# Patient Record
Sex: FEMALE | Race: WHITE | NOT HISPANIC OR LATINO | Employment: UNEMPLOYED | URBAN - METROPOLITAN AREA
[De-identification: names, ages, dates, MRNs, and addresses within clinical notes are randomized per-mention and may not be internally consistent; named-entity substitution may affect disease eponyms.]

---

## 2017-01-18 ENCOUNTER — GENERIC CONVERSION - ENCOUNTER (OUTPATIENT)
Dept: OTHER | Facility: OTHER | Age: 67
End: 2017-01-18

## 2017-01-23 ENCOUNTER — GENERIC CONVERSION - ENCOUNTER (OUTPATIENT)
Dept: OTHER | Facility: OTHER | Age: 67
End: 2017-01-23

## 2017-06-14 ENCOUNTER — ALLSCRIPTS OFFICE VISIT (OUTPATIENT)
Dept: OTHER | Facility: OTHER | Age: 67
End: 2017-06-14

## 2017-06-23 ENCOUNTER — GENERIC CONVERSION - ENCOUNTER (OUTPATIENT)
Dept: OTHER | Facility: OTHER | Age: 67
End: 2017-06-23

## 2017-06-23 LAB
A/G RATIO (HISTORICAL): 1.2 (CALC) (ref 1–2.5)
ALBUMIN SERPL BCP-MCNC: 3.8 G/DL (ref 3.6–5.1)
ALP SERPL-CCNC: 79 U/L (ref 33–130)
ALT SERPL W P-5'-P-CCNC: 9 U/L (ref 6–29)
AST SERPL W P-5'-P-CCNC: 14 U/L (ref 10–35)
BASOPHILS # BLD AUTO: 0.5 %
BASOPHILS # BLD AUTO: 24 CELLS/UL (ref 0–200)
BILIRUB SERPL-MCNC: 0.5 MG/DL (ref 0.2–1.2)
BUN SERPL-MCNC: 15 MG/DL (ref 7–25)
BUN/CREA RATIO (HISTORICAL): NORMAL (CALC) (ref 6–22)
CALCIUM (ADJUSTED FOR ALBUMIN) (HISTORICAL): 9.5 MG/DL (CALC) (ref 8.6–10.2)
CALCIUM SERPL-MCNC: 9.1 MG/DL (ref 8.6–10.4)
CHLORIDE SERPL-SCNC: 108 MMOL/L (ref 98–110)
CHOLEST SERPL-MCNC: 234 MG/DL (ref 125–200)
CHOLEST/HDLC SERPL: 2.9 (CALC)
CO2 SERPL-SCNC: 23 MMOL/L (ref 20–31)
CREAT SERPL-MCNC: 0.8 MG/DL (ref 0.5–0.99)
DEPRECATED RDW RBC AUTO: 14 % (ref 11–15)
EGFR AFRICAN AMERICAN (HISTORICAL): 90 ML/MIN/1.73M2
EGFR-AMERICAN CALC (HISTORICAL): 78 ML/MIN/1.73M2
EOSINOPHIL # BLD AUTO: 149 CELLS/UL (ref 15–500)
EOSINOPHIL # BLD AUTO: 3.1 %
GAMMA GLOBULIN (HISTORICAL): 3.1 G/DL (CALC) (ref 1.9–3.7)
GLUCOSE (HISTORICAL): 85 MG/DL (ref 65–99)
HCT VFR BLD AUTO: 42.9 % (ref 35–45)
HDLC SERPL-MCNC: 81 MG/DL
HEPATITIS C ANTIBODY (HISTORICAL): NORMAL
HGB BLD-MCNC: 13.8 G/DL (ref 11.7–15.5)
LDL CHOLESTEROL (HISTORICAL): 143 MG/DL (CALC)
LYMPHOCYTES # BLD AUTO: 1368 CELLS/UL (ref 850–3900)
LYMPHOCYTES # BLD AUTO: 28.5 %
MCH RBC QN AUTO: 30 PG (ref 27–33)
MCHC RBC AUTO-ENTMCNC: 32.1 G/DL (ref 32–36)
MCV RBC AUTO: 93.6 FL (ref 80–100)
MONOCYTES # BLD AUTO: 149 CELLS/UL (ref 200–950)
MONOCYTES (HISTORICAL): 3.1 %
NEUTROPHILS # BLD AUTO: 3110 CELLS/UL (ref 1500–7800)
NEUTROPHILS # BLD AUTO: 64.8 %
NON-HDL-CHOL (CHOL-HDL) (HISTORICAL): 153 MG/DL (CALC)
PLATELET # BLD AUTO: 203 THOUSAND/UL (ref 140–400)
PMV BLD AUTO: 8.4 FL (ref 7.5–12.5)
POTASSIUM SERPL-SCNC: 4.5 MMOL/L (ref 3.5–5.3)
RBC # BLD AUTO: 4.58 MILLION/UL (ref 3.8–5.1)
SIGNAL TO CUT-OFF (HISTORICAL): 0.01
SODIUM SERPL-SCNC: 141 MMOL/L (ref 135–146)
TOTAL PROTEIN (HISTORICAL): 6.9 G/DL (ref 6.1–8.1)
TRIGL SERPL-MCNC: 49 MG/DL
TSH SERPL DL<=0.05 MIU/L-ACNC: 2.36 MIU/L (ref 0.4–4.5)
WBC # BLD AUTO: 4.8 THOUSAND/UL (ref 3.8–10.8)

## 2017-07-16 ENCOUNTER — APPOINTMENT (EMERGENCY)
Dept: RADIOLOGY | Facility: HOSPITAL | Age: 67
End: 2017-07-16
Payer: COMMERCIAL

## 2017-07-16 ENCOUNTER — HOSPITAL ENCOUNTER (EMERGENCY)
Facility: HOSPITAL | Age: 67
Discharge: HOME | End: 2017-07-16
Admitting: EMERGENCY MEDICINE
Payer: COMMERCIAL

## 2017-07-16 VITALS
SYSTOLIC BLOOD PRESSURE: 130 MMHG | TEMPERATURE: 98 F | OXYGEN SATURATION: 97 % | RESPIRATION RATE: 18 BRPM | HEART RATE: 68 BPM | WEIGHT: 195 LBS | BODY MASS INDEX: 32.49 KG/M2 | HEIGHT: 65 IN | DIASTOLIC BLOOD PRESSURE: 77 MMHG

## 2017-07-16 DIAGNOSIS — M54.9 BACK PAIN, ACUTE: ICD-10-CM

## 2017-07-16 DIAGNOSIS — S16.1XXA CERVICAL STRAIN, INITIAL ENCOUNTER: Primary | ICD-10-CM

## 2017-07-16 PROCEDURE — 99283 EMERGENCY DEPT VISIT LOW MDM: CPT

## 2017-07-16 PROCEDURE — 96372 THER/PROPH/DIAG INJ SC/IM: CPT

## 2017-07-16 PROCEDURE — 71020 HB CHEST X-RAY 2VW FRONTAL&LATL: CPT

## 2017-07-16 PROCEDURE — 72072 X-RAY EXAM THORAC SPINE 3VWS: CPT

## 2017-07-16 PROCEDURE — 72040 X-RAY EXAM NECK SPINE 2-3 VW: CPT

## 2017-07-16 RX ORDER — LANOLIN ALCOHOL/MO/W.PET/CERES
1 CREAM (GRAM) TOPICAL DAILY
COMMUNITY

## 2017-07-16 RX ORDER — POTASSIUM CHLORIDE 1.5 G/1.77G
20 POWDER, FOR SOLUTION ORAL DAILY
COMMUNITY
End: 2019-01-14 | Stop reason: ALTCHOICE

## 2017-07-16 RX ORDER — METHOCARBAMOL 500 MG/1
500 TABLET, FILM COATED ORAL ONCE
Status: COMPLETED | OUTPATIENT
Start: 2017-07-16 | End: 2017-07-16

## 2017-07-16 RX ORDER — NAPROXEN 500 MG/1
250 TABLET ORAL 2 TIMES DAILY WITH MEALS
Qty: 40 TABLET | Refills: 0 | Status: SHIPPED | OUTPATIENT
Start: 2017-07-16 | End: 2018-02-09

## 2017-07-16 RX ORDER — LEVOTHYROXINE SODIUM 0.03 MG/1
25 TABLET ORAL DAILY
COMMUNITY
End: 2018-06-21 | Stop reason: SDUPTHER

## 2017-07-16 RX ORDER — METHOCARBAMOL 750 MG/1
750 TABLET, FILM COATED ORAL 3 TIMES DAILY
Qty: 15 TABLET | Refills: 0 | Status: SHIPPED | OUTPATIENT
Start: 2017-07-16 | End: 2018-02-09

## 2017-07-16 RX ORDER — MULTIVITAMIN
2 TABLET ORAL DAILY
COMMUNITY

## 2017-07-16 RX ORDER — KETOROLAC TROMETHAMINE 30 MG/ML
15 INJECTION, SOLUTION INTRAMUSCULAR; INTRAVENOUS ONCE
Status: COMPLETED | OUTPATIENT
Start: 2017-07-16 | End: 2017-07-16

## 2017-07-16 RX ORDER — MELATONIN
5000 DAILY
COMMUNITY

## 2017-07-16 RX ORDER — AMOXICILLIN 500 MG
1 CAPSULE ORAL DAILY
COMMUNITY
End: 2018-09-25

## 2017-07-16 RX ORDER — TRAMADOL HYDROCHLORIDE 50 MG/1
50 TABLET ORAL EVERY 8 HOURS PRN
Qty: 9 TABLET | Refills: 0 | Status: SHIPPED | OUTPATIENT
Start: 2017-07-16 | End: 2018-09-25

## 2017-07-16 RX ADMIN — KETOROLAC TROMETHAMINE 15 MG: 30 INJECTION, SOLUTION INTRAMUSCULAR at 13:18

## 2017-07-16 RX ADMIN — METHOCARBAMOL 500 MG: 500 TABLET ORAL at 13:17

## 2017-07-17 ENCOUNTER — ALLSCRIPTS OFFICE VISIT (OUTPATIENT)
Dept: OTHER | Facility: OTHER | Age: 67
End: 2017-07-17

## 2017-07-17 DIAGNOSIS — S33.5XXA SPRAIN OF LIGAMENTS OF LUMBAR SPINE: ICD-10-CM

## 2017-07-17 DIAGNOSIS — S23.9XXA SPRAIN OF THORAX: ICD-10-CM

## 2017-07-24 ENCOUNTER — GENERIC CONVERSION - ENCOUNTER (OUTPATIENT)
Dept: OTHER | Facility: OTHER | Age: 67
End: 2017-07-24

## 2017-07-24 ENCOUNTER — APPOINTMENT (OUTPATIENT)
Dept: PHYSICAL THERAPY | Facility: CLINIC | Age: 67
End: 2017-07-24
Payer: COMMERCIAL

## 2017-07-24 DIAGNOSIS — S23.9XXA SPRAIN OF THORAX: ICD-10-CM

## 2017-07-24 DIAGNOSIS — S33.5XXA SPRAIN OF LIGAMENTS OF LUMBAR SPINE: ICD-10-CM

## 2017-07-24 PROCEDURE — 97162 PT EVAL MOD COMPLEX 30 MIN: CPT

## 2017-07-27 ENCOUNTER — GENERIC CONVERSION - ENCOUNTER (OUTPATIENT)
Dept: OTHER | Facility: OTHER | Age: 67
End: 2017-07-27

## 2017-07-31 ENCOUNTER — APPOINTMENT (OUTPATIENT)
Dept: PHYSICAL THERAPY | Facility: CLINIC | Age: 67
End: 2017-07-31
Payer: COMMERCIAL

## 2017-07-31 PROCEDURE — 97110 THERAPEUTIC EXERCISES: CPT

## 2017-08-02 ENCOUNTER — ALLSCRIPTS OFFICE VISIT (OUTPATIENT)
Dept: OTHER | Facility: OTHER | Age: 67
End: 2017-08-02

## 2017-08-02 ENCOUNTER — APPOINTMENT (OUTPATIENT)
Dept: PHYSICAL THERAPY | Facility: CLINIC | Age: 67
End: 2017-08-02
Payer: COMMERCIAL

## 2017-08-04 ENCOUNTER — APPOINTMENT (OUTPATIENT)
Dept: PHYSICAL THERAPY | Facility: CLINIC | Age: 67
End: 2017-08-04
Payer: COMMERCIAL

## 2017-08-04 PROCEDURE — 97110 THERAPEUTIC EXERCISES: CPT

## 2017-08-07 ENCOUNTER — APPOINTMENT (OUTPATIENT)
Dept: PHYSICAL THERAPY | Facility: CLINIC | Age: 67
End: 2017-08-07
Payer: COMMERCIAL

## 2017-08-07 PROCEDURE — 97110 THERAPEUTIC EXERCISES: CPT

## 2017-08-09 ENCOUNTER — ALLSCRIPTS OFFICE VISIT (OUTPATIENT)
Dept: OTHER | Facility: OTHER | Age: 67
End: 2017-08-09

## 2017-08-10 ENCOUNTER — APPOINTMENT (OUTPATIENT)
Dept: PHYSICAL THERAPY | Facility: CLINIC | Age: 67
End: 2017-08-10
Payer: COMMERCIAL

## 2017-08-10 PROCEDURE — 97140 MANUAL THERAPY 1/> REGIONS: CPT

## 2017-08-10 PROCEDURE — 97110 THERAPEUTIC EXERCISES: CPT

## 2017-08-11 ENCOUNTER — GENERIC CONVERSION - ENCOUNTER (OUTPATIENT)
Dept: OTHER | Facility: OTHER | Age: 67
End: 2017-08-11

## 2017-08-14 ENCOUNTER — APPOINTMENT (OUTPATIENT)
Dept: PHYSICAL THERAPY | Facility: CLINIC | Age: 67
End: 2017-08-14
Payer: COMMERCIAL

## 2017-08-14 PROCEDURE — 97110 THERAPEUTIC EXERCISES: CPT

## 2017-08-14 PROCEDURE — 97140 MANUAL THERAPY 1/> REGIONS: CPT

## 2017-08-15 ENCOUNTER — ALLSCRIPTS OFFICE VISIT (OUTPATIENT)
Dept: OTHER | Facility: OTHER | Age: 67
End: 2017-08-15

## 2017-08-17 ENCOUNTER — APPOINTMENT (OUTPATIENT)
Dept: PHYSICAL THERAPY | Facility: CLINIC | Age: 67
End: 2017-08-17
Payer: COMMERCIAL

## 2017-08-17 PROCEDURE — 97110 THERAPEUTIC EXERCISES: CPT

## 2017-08-17 PROCEDURE — 97140 MANUAL THERAPY 1/> REGIONS: CPT

## 2017-08-21 ENCOUNTER — APPOINTMENT (OUTPATIENT)
Dept: PHYSICAL THERAPY | Facility: CLINIC | Age: 67
End: 2017-08-21
Payer: COMMERCIAL

## 2017-08-21 PROCEDURE — 97010 HOT OR COLD PACKS THERAPY: CPT

## 2017-08-21 PROCEDURE — 97140 MANUAL THERAPY 1/> REGIONS: CPT

## 2017-08-21 PROCEDURE — 97110 THERAPEUTIC EXERCISES: CPT

## 2017-08-21 PROCEDURE — 97035 APP MDLTY 1+ULTRASOUND EA 15: CPT

## 2017-08-24 ENCOUNTER — APPOINTMENT (OUTPATIENT)
Dept: PHYSICAL THERAPY | Facility: CLINIC | Age: 67
End: 2017-08-24
Payer: COMMERCIAL

## 2017-08-24 PROCEDURE — 97110 THERAPEUTIC EXERCISES: CPT

## 2017-08-24 PROCEDURE — 97010 HOT OR COLD PACKS THERAPY: CPT

## 2017-08-24 PROCEDURE — 97140 MANUAL THERAPY 1/> REGIONS: CPT

## 2017-08-25 ENCOUNTER — GENERIC CONVERSION - ENCOUNTER (OUTPATIENT)
Dept: OTHER | Facility: OTHER | Age: 67
End: 2017-08-25

## 2017-08-26 ENCOUNTER — GENERIC CONVERSION - ENCOUNTER (OUTPATIENT)
Dept: OTHER | Facility: OTHER | Age: 67
End: 2017-08-26

## 2017-08-27 ENCOUNTER — GENERIC CONVERSION - ENCOUNTER (OUTPATIENT)
Dept: OTHER | Facility: OTHER | Age: 67
End: 2017-08-27

## 2017-08-28 ENCOUNTER — GENERIC CONVERSION - ENCOUNTER (OUTPATIENT)
Dept: OTHER | Facility: OTHER | Age: 67
End: 2017-08-28

## 2017-08-28 ENCOUNTER — APPOINTMENT (OUTPATIENT)
Dept: PHYSICAL THERAPY | Facility: CLINIC | Age: 67
End: 2017-08-28
Payer: COMMERCIAL

## 2017-08-31 ENCOUNTER — APPOINTMENT (OUTPATIENT)
Dept: PHYSICAL THERAPY | Facility: CLINIC | Age: 67
End: 2017-08-31
Payer: COMMERCIAL

## 2017-08-31 PROCEDURE — 97110 THERAPEUTIC EXERCISES: CPT

## 2017-09-05 ENCOUNTER — ALLSCRIPTS OFFICE VISIT (OUTPATIENT)
Dept: OTHER | Facility: OTHER | Age: 67
End: 2017-09-05

## 2017-09-05 DIAGNOSIS — M54.50 LOW BACK PAIN: ICD-10-CM

## 2017-09-05 DIAGNOSIS — M79.2 NEURALGIA AND NEURITIS: ICD-10-CM

## 2017-09-06 ENCOUNTER — APPOINTMENT (OUTPATIENT)
Dept: PHYSICAL THERAPY | Facility: CLINIC | Age: 67
End: 2017-09-06
Payer: COMMERCIAL

## 2017-09-06 PROCEDURE — 97110 THERAPEUTIC EXERCISES: CPT

## 2017-09-08 ENCOUNTER — APPOINTMENT (OUTPATIENT)
Dept: PHYSICAL THERAPY | Facility: CLINIC | Age: 67
End: 2017-09-08
Payer: COMMERCIAL

## 2017-09-08 PROCEDURE — 97110 THERAPEUTIC EXERCISES: CPT

## 2017-09-11 ENCOUNTER — APPOINTMENT (OUTPATIENT)
Dept: PHYSICAL THERAPY | Facility: CLINIC | Age: 67
End: 2017-09-11
Payer: COMMERCIAL

## 2017-09-11 PROCEDURE — 97110 THERAPEUTIC EXERCISES: CPT

## 2017-09-14 ENCOUNTER — APPOINTMENT (OUTPATIENT)
Dept: PHYSICAL THERAPY | Facility: CLINIC | Age: 67
End: 2017-09-14
Payer: COMMERCIAL

## 2017-09-14 PROCEDURE — 97110 THERAPEUTIC EXERCISES: CPT

## 2017-09-14 PROCEDURE — G8992 OTHER PT/OT  D/C STATUS: HCPCS | Performed by: PHYSICAL THERAPIST

## 2017-09-14 PROCEDURE — G8991 OTHER PT/OT GOAL STATUS: HCPCS | Performed by: PHYSICAL THERAPIST

## 2017-09-18 ENCOUNTER — HOSPITAL ENCOUNTER (OUTPATIENT)
Dept: MRI IMAGING | Facility: HOSPITAL | Age: 67
Discharge: HOME | End: 2017-09-18
Attending: FAMILY MEDICINE
Payer: COMMERCIAL

## 2017-09-18 ENCOUNTER — GENERIC CONVERSION - ENCOUNTER (OUTPATIENT)
Dept: OTHER | Facility: OTHER | Age: 67
End: 2017-09-18

## 2017-09-18 DIAGNOSIS — M54.50 LOW BACK PAIN: ICD-10-CM

## 2017-09-18 DIAGNOSIS — M79.2 NEURALGIA AND NEURITIS: ICD-10-CM

## 2017-09-18 PROCEDURE — 72148 MRI LUMBAR SPINE W/O DYE: CPT

## 2017-09-22 ENCOUNTER — GENERIC CONVERSION - ENCOUNTER (OUTPATIENT)
Dept: OTHER | Facility: OTHER | Age: 67
End: 2017-09-22

## 2017-09-25 ENCOUNTER — GENERIC CONVERSION - ENCOUNTER (OUTPATIENT)
Dept: OTHER | Facility: OTHER | Age: 67
End: 2017-09-25

## 2017-10-02 ENCOUNTER — GENERIC CONVERSION - ENCOUNTER (OUTPATIENT)
Dept: OTHER | Facility: OTHER | Age: 67
End: 2017-10-02

## 2017-11-17 ENCOUNTER — ALLSCRIPTS OFFICE VISIT (OUTPATIENT)
Dept: OTHER | Facility: OTHER | Age: 67
End: 2017-11-17

## 2017-11-20 NOTE — PROGRESS NOTES
Assessment  1  Chronic right-sided thoracic back pain (724 1,338 29) (M54 6,G89 29)   2  Lower back pain (724 2) (M54 5)   3  Pain syndrome, chronic (338 4) (G89 4)   4  Neuropathic pain (729 2) (M79 2)    Plan   Neuropathic pain, Thoracic sprain    · Gabapentin 300 MG Oral Capsule; Take 1 po hs   Rx By: Author Mujica; Dispense: 30 Days ; #:30 Capsule; Refill: 2;Neuropathic pain, Thoracic sprain; SALVADOR = N; Verified Transmission to [facility]/PHARMACY# 7026; Last Updated By: System, [employer]; 11/17/2017 9:02:49 AM  Follow-up visit in 3 months Evaluation and Treatment  Follow-up  Status: Hold For - Scheduling  Requested for: 30QXH1893 Ordered; For: Pain syndrome, chronic;  Ordered By: Author Mujica  Performed:   Due: 69AUG5242   Discussion/Summary    While the patient was in the office today, I did have a thorough conversation with the patient regarding her medication regimen and treatment plan  I explained to her that recently there has been more reporting of abuse of different medications and gabapentin is 1 of them  However, I explained to her that the key is that the medication is being abused, meaning that it is not being taken by people who it is prescribed to and/or not taken as prescribed and in significantly larger amounts insure periods of time to create a euphoric feeling  I thoroughly reviewed with the patient that she is on a very low dose of the gabapentin and that gabapentin is truly a very safe medication as long as it is taken as prescribed like any other medication  I explained to her that it is not a narcotic and I explained how it works and why it works and why it makes sense for her kind of pain to be managed on it for now  The patient seemed significantly relieved after our conversation  at this point after a thorough conversation with the patient we decided to decrease the gabapentin down to 300 mg a day for the next 3 months and see how she does   I did explain to the patient that no matter what medication we manage patient's on, our goal is always to have patients on the least amount of medicines with the least amount of side effects  At her next office visit we would then re-evaluate her need for the Gabapentin and even consider further titrating her down to 200 mg a day or less  The patient was agreeable and verbalized an understanding  patient is to schedule a follow-up office visit in 3 months and at that point time we will regroup with regards to their medication regimen and treatment plan  The patient was agreeable and verbalized an understanding  The patient has the current Goals: To continue with at least a current level of pain relief and to try to slowly and steadily titrate down on the gabapentin as far as possible over time  The patent has the current Barriers: Chronic painSyndrome and post herpetic neuralgia  Patient is able to Self-Care  The treatment plan was reviewed with the patient/guardian  The patient/guardian understands and agrees with the treatment plan   The patient was counseled regarding instructions for management,-- risk factor reductions,-- prognosis,-- patient and family education,-- risks and benefits of treatment options-- and-- importance of compliance with treatment  total time of encounter was 25 minutes  Chief Complaint    1  Pain  Chronic right sided thoracic and low back pain, significantly approved/stable  History of Present Illness  The patient presents today for a follow-up office visit  She is currently being treated for her chronic right-sided thoracic and low back pain, most likely related to her post herpetic neuralgia and her lumbar facet arthrosis, which the patient reports has significantly improved to the point of almost complete resolution of her pain with the changes made to her medication regimen   The patient reports that she has almost 100% relief of her pain symptoms, without any side effects from the gabapentin, however, she reports that she was reading a news article that stated the gabapentin is going to be the next heroin and she is afraid to stay on this medication, however, she is also afraid to not take the medication because she is having such significant pain relief  The patient presents today to discuss her medication regimen and treatment plan  Annamaria Zepeda presents with complaints of occasional episodes of mild right upper back and right lower back pain, radiating to the upper back and lower back  On a scale of 1 to 10, the patient rates the pain as 1  Symptoms are improving  Review of Systems   Constitutional: no fever,-- no recent weight gain-- and-- no recent weight loss  Eyes: no double vision-- and-- no blurry vision  Cardiovascular: no chest pain,-- no palpitations-- and-- no lower extremity edema  Respiratory: no complaints of shortness of breath-- and-- no wheezing  Musculoskeletal: no difficulty walking,-- no muscle weakness,-- no joint stiffness,-- no joint swelling,-- no limb swelling,-- no pain in extremity-- and-- no decreased range of motion  Neurological: no dizziness,-- no difficulty swallowing,-- no memory loss,-- no loss of consciousness-- and-- no seizures  Gastrointestinal: no nausea,-- no vomiting,-- no constipation-- and-- no diarrhea  Genitourinary: no difficulty initiating urine stream,-- no genital pain-- and-- no frequent urination  Integumentary: no complaints of skin rash  Psychiatric: no depression  Endocrine: no excessive thirst,-- no adrenal disease,-- no hypothyroidism-- and-- no hyperthyroidism  Hematologic/Lymphatic: no tendency for easy bruising-- and-- no tendency for easy bleeding  Active Problems  1  Anxiety (300 00) (F41 9)   2  Atrial fibrillation (427 31) (I48 91)   3  BMI 34 0-34 9,adult (V85 34) (Z68 34)   4  Bruit of left carotid artery (785 9) (R09 89)   5  Chronic right-sided thoracic back pain (724 1,338 29) (M54 6,G89 29)   6   Esophageal reflux (974 81) (K21 9)   7  Hiatal hernia (553 3) (K44 9)   8  Hyperlipidemia (272 4) (E78 5)   9  Hypothyroidism (244 9) (E03 9)   10  Lower back pain (724 2) (M54 5)   11  Lumbar sprain (847 2) (S33 5XXA)   12  Neuropathic pain (729 2) (M79 2)   13  Osteoporosis (733 00) (M81 0)   14  Pain of right scapula (733 90) (M89 8X1)   15  Pain syndrome, chronic (338 4) (G89 4)   16  Phlebitis or thrombophlebitis of superficial vessel of lower extremity (451 0) (I80 00)   17  Post herpetic neuralgia (053 19) (B02 29)   18  S/P RF ablation operation for arrhythmia (V45 89) (Z98 890,Z86 79)   19  Screening for genitourinary condition (V81 6) (Z13 89)   20  Screening for neurological condition (V80 09) (Z13 89)   21  Shoulder pain (719 41) (M25 519)   22  Thoracic sprain (847 1) (S23 9XXA)   23  Varicose Veins Of Lower Extremities (454 9)   24  Vitamin D deficiency (268 9) (E55 9)    Past Medical History    1  History of Encounter for screening mammogram for malignant neoplasm of breast (V76 12) (Z12 31)   2  History of Fibrocystic breast disease, unspecified laterality (610 1) (N60 19)   3  History of Ganglion Of The Right Wrist (727 43)   4  History of herpes zoster (V12 09) (Z86 19)   5  History of postmenopausal hormone replacement therapy (V87 49) (Z92 29)   6  History of Lyme disease (088 81) (A69 20)   7  History of Screening for osteoporosis (V82 81) (Z13 820)    The active problems and past medical history were reviewed and updated today  Surgical History  1  History of Biopsy Breast Open   2  History of Colonoscopy (Fiberoptic) Screening   3  History of Heart Surgery    The surgical history was reviewed and updated today  Family History  Mother    1  Family history of Diabetes Mellitus (V18 0)  Sister    2  Family history of Cerebral Artery Aneurysm  Maternal Grandmother    3  Family history of diabetes mellitus (V18 0) (Z83 3)  Family History    4  Family history of cardiac disorder (V17 49) (Z82 49)   5   Family history of Heart Disease (V17 49)    The family history was reviewed and updated today  Social History     · Denied: History of Drug use   ·    · Never a smoker   · Never Drank Alcohol  The social history was reviewed and updated today  The social history was reviewed and is unchanged  Current Meds   1  Maya-C 500 MG TABS; TAKE 1 TABLET DAILY; Therapy: (Recorded:04Apr2016) to Recorded   2  Flecainide Acetate 50 MG Oral Tablet; TAKE 1 TABLET TWICE DAILY Recorded   3  Gabapentin 400 MG Oral Capsule; TAKE 1 CAPSULE AT BEDTIME; Therapy: 93GCO7485 to (Mae Garcia)  Requested for: 42RSA3717; Last Rx:03Uqi8128 Ordered   4  Glucosamine Chondroitin TABS; Therapy: (Recorded:90Mqr7394) to Recorded   5  Klor-Con M20 20 MEQ Oral Tablet Extended Release; TAKE 1 TABLET DAILY; Therapy: (Recorded:04Apr2016) to Recorded   6  KP Fish Oil 1200 MG Oral Capsule; take 1 capsule daily; Therapy: (Recorded:65Koz3191) to Recorded   7  Levothyroxine Sodium 25 MCG Oral Tablet; take 1 tablet by mouth every day; Therapy: 64DLD7264 to ()  Requested for: 88ZHS1183; Last Rx:01Oct2017 Ordered   8  Magnesium CAPS; Therapy: (Recorded:16Tyw9153) to Recorded   9  Metoprolol Succinate ER 25 MG Oral Tablet Extended Release 24 Hour; TAKE 1 TABLET DAILY; Therapy: (Recorded:62Qbz3044) to Recorded   10  Multivitamins Oral Capsule; Therapy: 66YHK7363 to Recorded   11  Progesterone CREA; USE EXTERNALLY AS DIRECTED; Therapy: (Recorded:04Apr2016) to Recorded   12  Red Yeast Rice 600 MG Oral Capsule; Therapy: 17XSC5392 to Recorded   13  Vitamin D3 5000 UNIT Oral Capsule; 1 tablet daily; Therapy: (Recorded:04Apr2016) to Recorded   14  Xarelto 20 MG Oral Tablet; Take one tablet daily at night; Therapy: (Recorded:39Gsj8719) to Recorded    The medication list was reviewed and updated today  Allergies  1   No Known Drug Allergies    Vitals  Vital Signs    Recorded: 51EXD7771 08:47AM   Temperature 98 F   Heart Rate 56 Systolic 948   Diastolic 74   Height 5 ft 4 in   Weight 206 lb    BMI Calculated 35 36   BSA Calculated 1 98   Pain Scale 1       Physical Exam   Constitutional  General appearance: Well developed, well nourished, alert, in no distress, non-toxic and no overt pain behavior  Eyes  Sclera: anicteric  HEENT  Hearing grossly intact  Pulmonary  Respiratory effort: Even and unlabored  Cardiovascular  Examination of extremities: No edema or pitting edema present  Abdomen  Abdomen: Soft, non-tender, non-distended  Skin  Skin and subcutaneous tissue: Normal without rashes or lesions, well hydrated  Psychiatric  Mood and affect: Mood and affect appropriate  Neurologic  Cranial nerves: Cranial nerves II-XII grossly intact     the muscle tone was normal  Musculoskeletal Tandem Gait: Intact      Future Appointments    Date/Time Provider Specialty Site   02/09/2018 10:15 AM [facility] [place] [address]       Signatures   Electronically signed by : Kala Villatoro ; Nov 19 2017  7:24AM EST                       (Author)    Electronically signed by : Zari Bernal DO; Nov 19 2017  4:29PM EST

## 2018-01-12 VITALS
WEIGHT: 200.25 LBS | OXYGEN SATURATION: 98 % | SYSTOLIC BLOOD PRESSURE: 120 MMHG | TEMPERATURE: 99.2 F | DIASTOLIC BLOOD PRESSURE: 80 MMHG | HEART RATE: 120 BPM | HEIGHT: 64 IN | BODY MASS INDEX: 34.19 KG/M2

## 2018-01-12 VITALS
WEIGHT: 206 LBS | TEMPERATURE: 98 F | SYSTOLIC BLOOD PRESSURE: 116 MMHG | HEART RATE: 56 BPM | BODY MASS INDEX: 35.17 KG/M2 | DIASTOLIC BLOOD PRESSURE: 74 MMHG | HEIGHT: 64 IN

## 2018-01-12 VITALS
OXYGEN SATURATION: 97 % | SYSTOLIC BLOOD PRESSURE: 115 MMHG | HEIGHT: 64 IN | BODY MASS INDEX: 34.4 KG/M2 | WEIGHT: 201.5 LBS | HEART RATE: 75 BPM | TEMPERATURE: 98.4 F | DIASTOLIC BLOOD PRESSURE: 70 MMHG

## 2018-01-12 VITALS
HEIGHT: 64 IN | OXYGEN SATURATION: 98 % | WEIGHT: 199 LBS | TEMPERATURE: 97.2 F | SYSTOLIC BLOOD PRESSURE: 112 MMHG | HEART RATE: 67 BPM | BODY MASS INDEX: 33.97 KG/M2 | DIASTOLIC BLOOD PRESSURE: 76 MMHG

## 2018-01-12 NOTE — RESULT NOTES
Discussion/Summary   labs are stable but chol is too hih  see me to disus meds for this  Verified Results  (Q) CBC (INCLUDES DIFF/PLT) (REFL) 26VJA8585 12:00AM [facility]     Test Name Result Flag Reference   WHITE BLOOD CELL COUNT 4 8 Thousand/uL  3 8-10 8   RED BLOOD CELL COUNT 4 58 Million/uL  3 80-5 10   HEMOGLOBIN 13 8 g/dL  11 7-15 5   HEMATOCRIT 42 9 %  35 0-45 0   MCV 93 6 fL  80 0-100 0   MCH 30 0 pg  27 0-33 0   MCHC 32 1 g/dL  32 0-36 0   RDW 14 0 %  11 0-15 0   PLATELET COUNT 060 Thousand/uL  140-400   MPV 8 4 fL  7 5-12 5   ABSOLUTE NEUTROPHILS 3110 cells/uL  9341-5285   ABSOLUTE LYMPHOCYTES 1368 cells/uL  850-3900   ABSOLUTE MONOCYTES 149 cells/uL L 200-950   ABSOLUTE EOSINOPHILS 149 cells/uL     ABSOLUTE BASOPHILS 24 cells/uL  0-200   NEUTROPHILS 64 8 %     LYMPHOCYTES 28 5 %     MONOCYTES 3 1 %     EOSINOPHILS 3 1 %     BASOPHILS 0 5 %       (Q) COMPREHENSIVE METABOLIC PNL W/ADJUSTED CALCIUM 17NKS2194 12:00AM [facility]     Test Name Result Flag Reference   GLUCOSE 85 mg/dL  65-99   Fasting reference interval   UREA NITROGEN (BUN) 15 mg/dL  7-25   CREATININE 0 80 mg/dL  0 50-0 99   For patients >52years of age, the reference limit  for Creatinine is approximately 13% higher for people  identified as -American  eGFR NON-AFR   AMERICAN 78 mL/min/1 73m2  > OR = 60   eGFR AFRICAN AMERICAN 90 mL/min/1 73m2  > OR = 60   BUN/CREATININE RATIO   1-36   NOT APPLICABLE (calc)   SODIUM 141 mmol/L  135-146   POTASSIUM 4 5 mmol/L  3 5-5 3   CHLORIDE 108 mmol/L     CARBON DIOXIDE 23 mmol/L  20-31   CALCIUM 9 1 mg/dL  8 6-10 4   CALCIUM (ADJUSTED FOR$ALBUMIN) 9 5 mg/dL (calc)  8 6-10 2   PROTEIN, TOTAL 6 9 g/dL  6 1-8 1   ALBUMIN 3 8 g/dL  3 6-5 1   GLOBULIN 3 1 g/dL (calc)  1 9-3 7   ALBUMIN/GLOBULIN RATIO 1 2 (calc)  1 0-2 5   BILIRUBIN, TOTAL 0 5 mg/dL  0 2-1 2   ALKALINE PHOSPHATASE 79 U/L     AST 14 U/L  10-35   ALT 9 U/L  6-29     (Q) HEPATITIS C ANTIBODY 96VMQ4567 12: Alan Granado     Test Name Result Flag Reference   HEPATITIS C ANTIBODY NON-REACTIVE  NON-REACTIVE   SIGNAL TO CUT-OFF 0 01  <1 00     (Q) LIPID PANEL WITH NON-HDL CHOLESTEROL 16SZO5032 12:00AM Susy Lira     Test Name Result Flag Reference   CHOLESTEROL, TOTAL 234 mg/dL H 125-200   HDL CHOLESTEROL 81 mg/dL  > OR = 46   TRIGLICERIDES 49 mg/dL  <817   LDL-CHOLESTEROL 143 mg/dL (calc) H <130   Desirable range <100 mg/dL for patients with CHD or  diabetes and <70 mg/dL for diabetic patients with  known heart disease  CHOL/HDLC RATIO 2 9 (calc)  < OR = 5 0   NON HDL CHOLESTEROL 153 mg/dL (calc)     Target for non-HDL cholesterol is 30 mg/dL higher than   LDL cholesterol target       (Q) TSH, 3RD GENERATION 56UIG7304 12:00AM Susy Lira     Test Name Result Flag Reference   TSH 2 36 mIU/L  0 40-4 50

## 2018-01-12 NOTE — MISCELLANEOUS
Message   Date: 25 Aug 2017 4:01 PM EST, Recorded By: Joie Nevarez For: Hardeep Isaac: Dr Nina Hill, Provider   Shannon Pradhan from Dr Nina Hill requested records  Faxed consultation note & x-ray to [phone]        Active Problems    1  Anxiety (300 00) (F41 9)   2  Atrial fibrillation (427 31) (I48 91)   3  BMI 34 0-34 9,adult (V85 34) (Z68 34)   4  Bruit of left carotid artery (785 9) (R09 89)   5  Esophageal reflux (530 81) (K21 9)   6  Hiatal hernia (553 3) (K44 9)   7  Hyperlipidemia (272 4) (E78 5)   8  Hypothyroidism (244 9) (E03 9)   9  Lower back pain (724 2) (M54 5)   10  Lumbar sprain (847 2) (S33 5XXA)   11  Neuropathic pain (729 2) (M79 2)   12  Osteoporosis (733 00) (M81 0)   13  Phlebitis or thrombophlebitis of superficial vessel of lower extremity (451 0) (I80 00)   14  S/P RF ablation operation for arrhythmia (V45 89) (Z98 890,Z86 79)   15  Shoulder pain (719 41) (M25 519)   16  Thoracic sprain (847 1) (S23 9XXA)   17  Varicose Veins Of Lower Extremities (454 9)   18  Vitamin D deficiency (268 9) (E55 9)    Current Meds   1  Calcium TABS; Therapy: (Recorded:96Mpo0470) to Recorded   2  Maya-C 500 MG TABS; TAKE 1 TABLET DAILY; Therapy: (Recorded:04Apr2016) to Recorded   3  Fish Oil CAPS; Therapy: (Dylan Lev) to Recorded   4  Gabapentin 100 MG Oral Capsule; Take one tablet at bedtime x4 days, then take 2   tablets at bedtime x4 days, then 3 tablets at bedtime; Therapy: 42BWH3706 to (Evaluate:63Byi0393)  Requested for: 62Epf3594 Recorded   5  Glucosamine Chondroitin TABS; Therapy: (Recorded:84Xcb8190) to Recorded   6  Klor-Con M20 20 MEQ Oral Tablet Extended Release; TAKE 1 TABLET DAILY; Therapy: (Recorded:04Apr2016) to Recorded   7  Levothyroxine Sodium 25 MCG Oral Tablet; take 1 tablet by mouth every day; Therapy: 55JCQ8303 to (Evaluate:12Gwy9951)  Requested for: 02Sep2016; Last   Rx:02Sep2016 Ordered   8  Magnesium CAPS;    Therapy: (Recorded:47Xrf9266) to Recorded   9  Multivitamins Oral Capsule; Therapy: 10QSN4536 to Recorded   10  Omega-3 Fish Oil 1000 MG Oral Capsule; TAKE 1 CAPSULE DAILY; Therapy: 00CSX4023 to Recorded   11  Progesterone CREA; USE EXTERNALLY AS DIRECTED; Therapy: (Recorded:33Muk9046) to Recorded   12  Red Yeast Rice 600 MG Oral Capsule; Therapy: 80PHJ5396 to Recorded   13  Vitamin D3 1000 UNIT Oral Capsule; TAKE AS DIRECTED; Therapy: 72KNW7712 to Recorded   14  Vitamin D3 5000 UNIT Oral Capsule; 1 tablet daily; Therapy: (Recorded:04Apr2016) to Recorded    Allergies    1  No Known Drug Allergies    Signatures   Electronically signed by :  Mariella Guidry, ; Aug 25 2017  4:04PM EST                       (Author)

## 2018-01-13 VITALS
HEART RATE: 88 BPM | DIASTOLIC BLOOD PRESSURE: 64 MMHG | HEIGHT: 64 IN | SYSTOLIC BLOOD PRESSURE: 118 MMHG | BODY MASS INDEX: 33.97 KG/M2 | TEMPERATURE: 98.2 F | WEIGHT: 199 LBS

## 2018-01-13 VITALS
HEART RATE: 118 BPM | SYSTOLIC BLOOD PRESSURE: 104 MMHG | HEIGHT: 64 IN | TEMPERATURE: 98.2 F | WEIGHT: 199 LBS | DIASTOLIC BLOOD PRESSURE: 72 MMHG | BODY MASS INDEX: 33.97 KG/M2 | OXYGEN SATURATION: 97 %

## 2018-01-13 NOTE — RESULT NOTES
Message   i     Verified Results  (1) LIPID PANEL, FASTING 05Apr2016 07:00AM Marcelino Mendez     Test Name Result Flag Reference   CHOLESTEROL, TOTAL 244 mg/dL H 125-200   HDL CHOLESTEROL 68 mg/dL  > OR = 46   TRIGLICERIDES 81 mg/dL  <118   LDL-CHOLESTEROL 160 mg/dL (calc) H <130   Desirable range <100 mg/dL for patients with CHD or  diabetes and <70 mg/dL for diabetic patients with  known heart disease  CHOL/HDLC RATIO 3 6 (calc)  < OR = 5 0   NON HDL CHOLESTEROL 176 mg/dL (calc) H    Target for non-HDL cholesterol is 30 mg/dL higher than   LDL cholesterol target  See Below     We received your handwritten test order and  performed the AMA defined lipid panel  If  this is not what you intended to order, please  contact your local   immediately so that we may adjust our billing  appropriately  You may also inquire about  alternative or additional testing  NO COLLECTION DATE RECEIVED  WE HAVE USED  THE DATE THE SPECIMEN WAS RECEIVED BY THIS  LABORATORY AS THE COLLECTION DATE  IF THIS  IS INCORRECT, PLEASE CONTACT CLIENT SERVICES  PHONE NUMBER: [phone]       Discussion/Summary   chol still too high    we will talk about meds at next visit

## 2018-01-13 NOTE — MISCELLANEOUS
Assessment   1  Atrial fibrillation (427 31) (I48 91)  2  Phlebitis or thrombophlebitis of superficial vessel of lower extremity (451 0) (I80 00)  3  Neuropathic pain (729 2) (M79 2)  4  Lower back pain (724 2) (M54 5)    Plan  Atrial fibrillation    · Routine Venipuncture - POC; Status:Complete;   Done: [id number]  Perform:[facility]; TVB:[id number]; Last Updated By:Fahad Morales; 9/5/2017 9:50:25   AM;Ordered; For:Atrial fibrillation; Ordered By:Paty Baird;  Lower back pain, Neuropathic pain    · * MRI LUMBAR SPINE WO CONTRAST; Status:Need Information - Financial  Authorization; Requested OYR:[id number];   Perform:[facility]; YAL:[id number]; Ordered; For:Lower back pain,   Neuropathic pain; Ordered By:Sharan Kraft;      1  Afib - the patient's heart rate and rhythm are regular today  She will continue her 3 medications that were restarted  They have been stopped after she had been out of A  fib for a year initially  She has follow-up with her cardiologist in October  She has a prescription from the cardiologist to have a flecainide level drawn today which will be done here in the office  2  Phlebitis-stable, ultrasounds in the hospital were negative for DVT  3  Neuropathic lower back pain-the patient has now done physical therapy, seen pain management, been on new medications and still has basically no relief of her symptoms  I am going to order an MRI  For now I advised that she continue the gabapentin that she feels it is giving her some relief  At this point she is not going to reschedule for any kind of injection given the A  fib and restarting blood thinners  She will wait to follow-up with her cardiologist to decide on this  Discussion/Summary  Medication SE Review and Pt Understands Tx: Possible side effects of new medications were reviewed with the patient/guardian today  The treatment plan was reviewed with the patient/guardian   The patient/guardian understands and agrees with the treatment plan      Chief Complaint  Chief Complaint Free Text Note Form: PT HERE FOR A FOLLOW UP TO [facility] [address] FOR A-FIB  PT WAS ADMITTED TO [facility] 8/2517 AND DISCHARGED ON 8/28/17  PT WAS AT THE SPINE DOCTOR AND HE WAS CONCERNED ABOUT HER HEART RATE AND SENT HER TO THE ER  PT HAS TO HAVE A FLECAINIDE LEVEL DONE TODAY FOR THE CARDIOLOGIST  History of Present Illness  TCM Communication [facility]: The patient is being contacted for follow-up after hospitalization  She was hospitalized at [facility]  The date of admission: 08/25/2017, date of discharge: 08/28/2017  Diagnosis: A-Fib  She was discharged to home  The patient is currently asymptomatic     Communication performed and completed by Rosibel Ortega   HPI: pt is here to f/u after a hospitalization at [facility]  she went to see a doctor who was supposed to do an injection   when she got there he evaluated her and he told her he couldn't do it - her HR was very high  he sent her directly to the ER after talking with her cardiologist  when she got the the ER she was HR was in the mid 100s  they could not get it down in the ER so she was admitted  admitted 8/21 discharge 8/25  they got her rate controlled with meds  they were going to do a cardioversion but they cancelled it Monday am because she was finally out of afib    she has had afib in 2015  had an ablation at that time  at that time she could feel it going back and forth  had episodes of passing out  this time she did not feel it at all  she had been feeling "crappy" for months - dragging, tired, just felt horrible  now she feels much better, actually feels great  no cp/sob now  no f/c  she's now back on xarelto, metoprolol and flecainide    they did u/s of both legs to r/o clots - they were negative  she has a h/o phlebitis    she has had back pain since May  no known trauma - she had been weeding a lot but does not remember a specific injury  she has been going to PT 2x/week for 6 weeks  she saw Dr Mila Yancey one time on 8/9/17  he sent her to Dr Nicolette Beth for a trigger point injection but never got it done as this was the day she was sent to the hospital  no radiation into the legs   always the same spot, over the upper lumbar spine and lower thoracic spine as well as just to the right of this area   she feels like she is "rolling on a ball" if she puts pressure in that spot - she was doing exercises at PT and told the therapist this   Dr Rudolph did start her on gabapentin which she feels is helping a little bit       Review of Systems    Preventive Quality 65 Older: Preventive Quality 65 and Older:1  The patient is currently asymptomatic1  Symptoms Include: 1    The patient currently has no urinary incontinence symptoms1         1 Amended By: Bashir Serrano; Sep 05 2017 10:36 AM EST    Active Problems   1  Anxiety (300 00) (F41 9)  2  Atrial fibrillation (427 31) (I48 91)  3  BMI 34 0-34 9,adult (V85 34) (Z68 34)  4  Bruit of left carotid artery (785 9) (R09 89)  5  Esophageal reflux (530 81) (K21 9)  6  Hiatal hernia (553 3) (K44 9)  7  Hyperlipidemia (272 4) (E78 5)  8  Hypothyroidism (244 9) (E03 9)  9  Lower back pain (724 2) (M54 5)  10  Lumbar sprain (847 2) (S33 5XXA)  11  Neuropathic pain (729 2) (M79 2)  12  Osteoporosis (733 00) (M81 0)  13  Phlebitis or thrombophlebitis of superficial vessel of lower extremity (451 0) (I80 00)  14  S/P RF ablation operation for arrhythmia (V45 89) (Z98 890,Z86 79)  15  Shoulder pain (719 41) (M25 519)  16  Thoracic sprain (847 1) (S23 9XXA)  17  Varicose Veins Of Lower Extremities (454 9)  18  Vitamin D deficiency (268 9) (E55 9)    Past Medical History   1  History of Encounter for screening mammogram for malignant neoplasm of breast   (V76 12) (Z12 31)  2  History of Fibrocystic breast disease, unspecified laterality (610 1) (N60 19)  3  History of Ganglion Of The Right Wrist (727 43)  4  History of herpes zoster (V12 09) (Z86 19)  5   History of postmenopausal hormone replacement therapy (V87 49) (Z92 29)  6  History of Lyme disease (088 81) (A69 20)  7  History of Screening for osteoporosis (V82 81) (G99 564)    Surgical History   1  History of Biopsy Breast Open  2  History of Colonoscopy (Fiberoptic) Screening  3  History of Heart Surgery  Surgical History Reviewed: The surgical history was reviewed and updated today  Family History  Mother   1  Family history of Diabetes Mellitus (V18 0)  Sister   2  Family history of Cerebral Artery Aneurysm  Maternal Grandmother   3  Family history of diabetes mellitus (V18 0) (Z83 3)  Family History   4  Family history of cardiac disorder (V17 49) (Z82 49)  5  Family history of Heart Disease (V17 49)  Family History Reviewed: The family history was reviewed and updated today  Social History     · Denied: History of Drug use   ·    · Never a smoker   · Never Drank Alcohol  Social History Reviewed: The social history was reviewed and updated today  Never a smoker             Current Meds  1  Calcium TABS; Therapy: (Recorded:48Arh1961) to Recorded  2  Maya-C 500 MG TABS; TAKE 1 TABLET DAILY; Therapy: (Recorded:04Apr2016) to Recorded  3  Gabapentin 100 MG Oral Capsule; Take one tablet at bedtime x4 days, then take 2   tablets at bedtime x4 days, then 3 tablets at bedtime; Therapy: 70TZE3517 to (Evaluate:14Oct2017)  Requested for: 15Jqq9150 Recorded  4  Glucosamine Chondroitin TABS; Therapy: (Recorded:63Eqp8576) to Recorded  5  Klor-Con M20 20 MEQ Oral Tablet Extended Release; TAKE 1 TABLET DAILY; Therapy: (Recorded:04Mlj6358) to Recorded  6  KP Fish Oil 1200 MG Oral Capsule; take 1 capsule daily; Therapy: (Recorded:93Mut3133) to Recorded  7  Levothyroxine Sodium 25 MCG Oral Tablet; take 1 tablet by mouth every day; Therapy: 79RQJ9925 to (Evaluate:48Zdw3342)  Requested for: 02Sep2016; Last   Rx:02Sep2016 Ordered  8  Magnesium CAPS; Therapy: (Recorded:17Tbi5496) to Recorded  9   Metoprolol Succinate ER 25 MG Oral Tablet Extended Release 24 Hour; TAKE 1 TABLET   DAILY; Therapy: (Recorded:05Sep2017) to Recorded  10  Multivitamins Oral Capsule; Therapy: 48XLE0629 to Recorded  11  Omega-3 Fish Oil 1000 MG Oral Capsule; TAKE 1 CAPSULE DAILY; Therapy: 30SLS2834 to Recorded  12  Progesterone CREA; USE EXTERNALLY AS DIRECTED; Therapy: (Recorded:80Ktn7221) to Recorded  13  Red Yeast Rice 600 MG Oral Capsule; Therapy: 17NDB8964 to Recorded  14  Vitamin D3 1000 UNIT Oral Capsule; TAKE AS DIRECTED; Therapy: 37KSR5798 to Recorded  15  Vitamin D3 5000 UNIT Oral Capsule; 1 tablet daily; Therapy: (Recorded:78Wxo7562) to Recorded  16  Xarelto 20 MG Oral Tablet; Take one tablet daily at night; Therapy: (Recorded:05Sep2017) to Recorded  Medication List Reviewed: The medication list was reviewed and updated today  Allergies   1  No Known Drug Allergies    Vitals  Signs   Recorded: 05Sep2017 09:07AM   Temperature: 98 4 F  Heart Rate: 75  Systolic: 964  Diastolic: 70  Height: 5 ft 4 in  Weight: 201 lb 8 oz  BMI Calculated: 34 59  BSA Calculated: 1 96  O2 Saturation: 97    Physical Exam    Constitutional   General appearance: No acute distress, well appearing and well nourished  obese  Eyes   Conjunctiva and lids: No swelling, erythema or discharge  Pulmonary   Respiratory effort: No increased work of breathing or signs of respiratory distress  Auscultation of lungs: Clear to auscultation  Cardiovascular   Auscultation of heart: Normal rate and rhythm, normal S1 and S2, without murmurs  Musculoskeletal   Gait and station: Normal     Inspection/palpation of joints, bones, and muscles: Abnormal   (significantly ttp over the upper lumbar/lower thoracic spine as well as on specific area just to the right of the spine)   Skin   Skin and subcutaneous tissue: Normal without rashes or lesions      Psychiatric   Orientation to person, place, and time: Normal     Mood and affect: Normal  Results/Data  Falls Risk Assessment (Dx Z13 89 Screen for Neurologic Disorder) 87BZZ6162 09:50AM User, Ahs     Test Name Result Flag Reference   Falls Risk      No falls in the past year     (1) PAP SMEAR CONVENTIONAL 76Fwp5108 12:00AM      Test Name Result Flag Reference   PAP SMEAR CONV COMMENT 07/27/2017     LAB AP CASE REPORT (Summary)     LAB AP GYN PRIMARY INTERPRETATION (Summary)     LAB AP GYN INTERPRETATION (Summary)     LAB AP GYN SPECIMEN ADEQUACY (Summary)     LAB AP GYN NOTE (Summary)     LAB AP GYN ADDITIONAL INFORMATION (Summary)     LAB AP CLINICAL INFORMATION (Summary)     LAB AP LMP (Summary)     LAB AP ADDENDUM 5 (Summary)     LAB AP ADDENDUM 4 (Summary)     LAB AP ADDENDUM 3 (Summary)     LAB AP ADDENDUM 2 (Summary)     LAB AP ADDENDUM 1 (Summary)       Summary / No summary entered :      No summary entered  Documents attached :      sPap Tests - Uma Snooks; Enc: [id number] - Image Encounter - Uam Snooks -      (Family Medicine) (Result Document)    Signatures   Electronically signed by : YENI Jaeger ; Sep  5 2017 10:18AM EST                       (Author)    Electronically signed by : YENI Jaeger ; Sep  5 2017 10:53AM EST                       (Author)

## 2018-01-13 NOTE — PROGRESS NOTES
Assessment    1  Atrial fibrillation (427 31) (I48 91)   2  Hyperlipidemia (272 4) (E78 5)   3  Hypothyroidism (244 9) (E03 9)   4  Encounter for preventive health examination (V70 0) (Z00 00)    Discussion/Summary  healthy adult female Currently, she eats an adequate diet and has an inadequate exercise regimen  the risks and benefits of cervical cancer screening were discussed To see her GYN in August Breast cancer screening: mammogram is current  Colorectal cancer screening: colorectal cancer screening is current  Osteoporosis screening: bone mineral density testing is current  The patient declines immunizations and Refused flu and pneumonia vaccines  Advice and education were given regarding weight bearing exercise, weight loss and vitamin D supplements  She's given a copy of her lipid panel to discuss with the cardiologist  I would support giving her a statin  She will see her GYN in August  She is scheduled to see the vascular surgeon regarding her varicose veins  Her labs were reviewed with her and she'll have repeat blood work later in the year  She will see the eye doctor once again and has already seen the dermatologist  She refused any further vaccines such as flu and pneumococcal       Chief Complaint  YEARLY PE  HER PAP IS SCHEDULED FOR aUGUST 20TH  MEDIATIONS WERE VERIFIED  DEPRESSION AND SMOKING STATUS HAS BEEN UPDATED  History of Present Illness  HM, Adult Female: The patient is being seen for a health maintenance evaluation  General Health: The patient's health since the last visit is described as good  She has regular dental visits  She denies vision problems  She denies hearing loss  Immunizations status: up to date  Lifestyle:  She consumes a diverse and healthy diet  She does not have any weight concerns  She exercises regularly  She does not use tobacco  She denies alcohol use  She denies drug use  Reproductive health: the patient is postmenopausal   she reports normal menses  Screening: cancer screening reviewed and current  metabolic screening reviewed and current  risk screening reviewed and current  HPI: here for well exam  Recently seen by dermatology and had a melanocytic nevus removed yesterday  He'll to see her GYN  Has had no recurrent episodes of atrial fibrillation  We'll see her cardiologist next month  Review of Systems    Constitutional: No fever, no chills, feels well, no tiredness, no recent weight gain or weight loss  Eyes: No complaints of eye pain, no red eyes, no eyesight problems, no discharge, no dry eyes, no itching of eyes  ENT: no complaints of earache, no loss of hearing, no nose bleeds, no nasal discharge, no sore throat, no hoarseness  Cardiovascular: No complaints of slow heart rate, no fast heart rate, no chest pain, no palpitations, no leg claudication, no lower extremity edema  Respiratory: No complaints of shortness of breath, no wheezing, no cough, no SOB on exertion, no orthopnea, no PND  Gastrointestinal: No complaints of abdominal pain, no constipation, no nausea or vomiting, no diarrhea, no bloody stools  Genitourinary: No complaints of dysuria, no incontinence, no pelvic pain, no dysmenorrhea, no vaginal discharge or bleeding  Musculoskeletal: No complaints of arthralgias, no myalgias, no joint swelling or stiffness, no limb pain or swelling  Integumentary: No complaints of skin rash or lesions, no itching, no skin wounds, no breast pain or lump  Neurological: No complaints of headache, no confusion, no convulsions, no numbness, no dizziness or fainting, no tingling, no limb weakness, no difficulty walking  Psychiatric: Not suicidal, no sleep disturbance, no anxiety or depression, no change in personality, no emotional problems  Endocrine: No complaints of proptosis, no hot flashes, no muscle weakness, no deepening of the voice, no feelings of weakness     Hematologic/Lymphatic: No complaints of swollen glands, no swollen glands in the neck, does not bleed easily, does not bruise easily  Active Problems    1  Anxiety (300 00) (F41 9)   2  Atrial fibrillation (427 31) (I48 91)   3  Atrial fibrillation (427 31) (I48 91)   4  Bruit of left carotid artery (785 9) (R09 89)   5  Encounter for screening for malignant neoplasm of colon (V76 51) (Z12 11)   6  Encounter for screening mammogram for malignant neoplasm of breast (V76 12)   (Z12 31)   7  Encounter for vitamin deficiency screening (V77 99) (Z13 21)   8  Esophageal reflux (530 81) (K21 9)   9  Hyperlipidemia (272 4) (E78 5)   10  Hypothyroidism (244 9) (E03 9)   11  Joint pain (719 40) (M25 50)   12  Need for diphtheria-tetanus-pertussis (Tdap) vaccine, adult/adolescent (V06 1) (Z23)   13  Need for pneumococcal vaccination (V03 82) (Z23)   14  Osteoporosis (733 00) (M81 0)   15  S/P RF ablation operation for arrhythmia (V45 89) (Z98 89,Z86 79)   16  Screening for diabetes mellitus (V77 1) (Z13 1)   17  Screening for endocrine, metabolic and immunity disorder (V77 99)    (Z13 29,Z13 0,Z13 228)   18  Screening for lipoid disorders (V77 91) (Z13 220)   19  Screening for thyroid disorder (V77 0) (Z13 29)   20  Screening, anemia, deficiency, iron (V78 0) (Z13 0)   21  Varicose Veins Of Lower Extremities (454 9)   22   Vitamin D deficiency (268 9) (E55 9)    Past Medical History    · History of Encounter for screening mammogram for malignant neoplasm of breast  (V76 12) (Z12 31)   · History of Fibrocystic breast disease, unspecified laterality (610 1) (N60 19)   · History of Ganglion Of The Right Wrist (727 43)   · History of herpes zoster (V12 09) (Z86 19)   · History of postmenopausal hormone replacement therapy (V87 49) (Z92 29)   · History of Lyme disease (088 81) (A69 20)   · History of Screening for osteoporosis (V82 81) (C08 329)    Surgical History    · History of Colonoscopy (Fiberoptic) Screening    Family History  Mother    · Family history of Diabetes Mellitus (V18 0)  Sister    · Family history of Cerebral Artery Aneurysm  Maternal Grandmother    · Family history of diabetes mellitus (V18 0) (Z83 3)  Family History    · Family history of cardiac disorder (V17 49) (Z82 49)   · Family history of Heart Disease (V17 49)    Social History    · Never a smoker   · Never A Smoker   · Never Drank Alcohol    Current Meds   1  Calcium TABS; Therapy: (Recorded:05Xub2616) to Recorded   2  Maya-C 500 MG TABS; TAKE 1 TABLET DAILY; Therapy: (Recorded:04Apr2016) to Recorded   3  Glucosamine Chondroitin TABS; Therapy: (Recorded:30Jul2015) to Recorded   4  Klor-Con M20 20 MEQ Oral Tablet Extended Release; TAKE 1 TABLET DAILY; Therapy: (Recorded:04Apr2016) to Recorded   5  Levothyroxine Sodium 25 MCG Oral Tablet; take 1 tablet by mouth every day; Therapy: 35LBM0777 to (Evaluate:00Oxo4905)  Requested for: 96VNB7866; Last   Rx:11Jan2016 Ordered   6  Magnesium CAPS; Therapy: (Recorded:11Igq9251) to Recorded   7  Multivitamins Oral Capsule; Therapy: 12NNI4624 to Recorded   8  Omega-3 Fish Oil 1000 MG Oral Capsule; TAKE 1 CAPSULE DAILY; Therapy: 37ZKJ2620 to Recorded   9  Potassium Chloride 20 MEQ/15ML (10%) Oral Solution; Therapy: 81CYN9569 to Recorded   10  Progesterone CREA; USE EXTERNALLY AS DIRECTED; Therapy: (Recorded:04Apr2016) to Recorded   11  Red Yeast Rice 600 MG Oral Capsule; Therapy: 60KQF5013 to Recorded   12  Vitamin D3 1000 UNIT Oral Capsule; TAKE AS DIRECTED; Therapy: 72JFJ9518 to Recorded   13  Vitamin D3 5000 UNIT Oral Capsule; 1 tablet daily; Therapy: (Recorded:04Apr2016) to Recorded    Allergies    1  No Known Drug Allergies    Vitals   Recorded: 60DYH2039 09:58AM   Temperature 97 9 F   Heart Rate 68   Systolic 755   Diastolic 70   Height 5 ft 4 in   Weight 199 lb 8 oz   BMI Calculated 34 24   BSA Calculated 1 95   O2 Saturation 98     Physical Exam    Constitutional   General appearance: No acute distress, well appearing and well nourished  Eyes   Conjunctiva and lids: No swelling, erythema or discharge  Pupils and irises: Equal, round and reactive to light  Ears, Nose, Mouth, and Throat   External inspection of ears and nose: Normal     Otoscopic examination: Tympanic membranes translucent with normal light reflex  Canals patent without erythema  Oropharynx: Normal with no erythema, edema, exudate or lesions  Pulmonary   Respiratory effort: No increased work of breathing or signs of respiratory distress  Auscultation of lungs: Clear to auscultation  Cardiovascular   Auscultation of heart: Normal rate and rhythm, normal S1 and S2, without murmurs  Examination of extremities for edema and/or varicosities: Normal     Abdomen   Abdomen: Non-tender, no masses  Liver and spleen: No hepatomegaly or splenomegaly  Lymphatic   Palpation of lymph nodes in neck: No lymphadenopathy  Musculoskeletal   Gait and station: Normal     Digits and nails: Normal without clubbing or cyanosis  Inspection/palpation of joints, bones, and muscles: Normal     Skin   Skin and subcutaneous tissue: Normal without rashes or lesions  Neurologic   Cranial nerves: Cranial nerves 2-12 intact  Reflexes: 2+ and symmetric  Sensation: No sensory loss      Psychiatric   Orientation to person, place, and time: Normal     Mood and affect: Normal        Signatures   Electronically signed by : Trey Higginbotham DO; Chris 10 2016 10:18AM EST                       (Author)

## 2018-01-14 VITALS
HEIGHT: 64 IN | DIASTOLIC BLOOD PRESSURE: 80 MMHG | HEART RATE: 70 BPM | WEIGHT: 199.75 LBS | BODY MASS INDEX: 34.1 KG/M2 | SYSTOLIC BLOOD PRESSURE: 120 MMHG | OXYGEN SATURATION: 96 % | TEMPERATURE: 98.9 F

## 2018-01-14 NOTE — RESULT NOTES
Discussion/Summary   Please that the patient know that her MRI results are back  It does indicate that she has a disc extrusion at the top of the lumbar spine  It is difficult to know just based on the results whether or not this is truly causing her pain or not but I think that she needs to follow up with pain management to go over the results of the MRI in more detail and decide if there something specific to do about them to help with her pain  Verified Results  * MRI LUMBAR SPINE WO CONTRAST [id number] 11:14AM Chon Mauricio Order Number: [id number]    - Patient Instructions: To schedule this appointment, please contact Central Scheduling at [phone]  Test Name Result Flag Reference   MRI LUMBAR SPINE WO CONTRAST (Report)     MRI LUMBAR SPINE WITHOUT CONTRAST     INDICATION: Low back pain  Neuralgia  COMPARISON: None  TECHNIQUE: Sagittal T1, sagittal T2, sagittal inversion recovery, axial T1 and axial T2, coronal T2       IMAGE QUALITY: Diagnostic     FINDINGS:     ALIGNMENT: L5 on S1 grade 1 retrolisthesis identified related to facet hypertrophy  Minimal L2 on L3 and L1 on L2 retrolisthesis also likely secondary to facet arthropathy  Alignment is otherwise anatomic  No scoliosis  MARROW SIGNAL: Endplate reactive marrow changes scattered throughout the lumbar spine  Scattered hemangiomas noted especially involving the sacrum  There are also scattered Schmorl's nodes evident  No worrisome marrow lesion  DISTAL CORD AND CONUS: Normal size and signal within the distal cord and conus  The conus ends at the L1-2 level  PARASPINAL SOFT TISSUES: Paraspinal soft tissues are unremarkable  SACRUM: Normal signal within the sacrum  No evidence of insufficiency or stress fracture  LOWER THORACIC DISC SPACES: Mild noncompressive lower thoracic disc bulges       LUMBAR DISC SPACES:        L1-L2: Mild disc bulge with superimposed right central disc herniation, extrusion type, with caudal migration  There is moderate right central canal narrowing and narrowing of the right subarticular recess  Facet hypertrophy noted  The foramina    remain patent  L2-L3: Disc desiccation noted without loss of disc height  Circumferential disc bulge with bilateral facet hypertrophy and ligamentum flavum infolding  The central canal remains patent  Minimal foraminal stenosis noted bilaterally  L3-L4: Disc desiccation without loss of disc height  Minimal disc bulge without significant central canal or foraminal stenosis  L4-L5: Disc desiccation and minimal circumferential disc bulge  Bilateral facet hypertrophy and ligamentum flavum infolding  The central canal and foramina remain patent  L5-S1: Diffuse disc bulge with inferior unroofing of the disc secondary to retrolisthesis as above  Prominent facet hypertrophy and ligamentum flavum infolding  The central canal remains patent  There is mild bilateral foraminal stenosis evident  IMPRESSION:   Mild spondylotic changes of the lumbar spine with a focal right central disc extrusion demonstrating caudal migration at the L1-2 level as above  There is narrowing of the right subarticular recess and clinical correlation for right L2    radiculopathy is therefore suggested         Workstation performed: [id number]     Signed by:   Ale Sarmiento MD   9/18/17

## 2018-01-15 ENCOUNTER — GENERIC CONVERSION - ENCOUNTER (OUTPATIENT)
Dept: OTHER | Facility: OTHER | Age: 68
End: 2018-01-15

## 2018-01-16 NOTE — PROGRESS NOTES
Assessment    1  Atrial fibrillation (427 31) (I48 91)   2  Encounter for preventive health examination (V70 0) (Z00 00)   3  Hiatal hernia (553 3) (K44 9)   4  Hyperlipidemia (272 4) (E78 5)   5  Hypothyroidism (244 9) (E03 9)    Plan  Atrial fibrillation    · (Q) CBC (INCLUDES DIFF/PLT) (REFL); Status:Active; Requested TCQ:[id number];    · (Q) COMPREHENSIVE METABOLIC PNL W/ADJUSTED CALCIUM; Status:Active; Requested NKA:[id number];    · (Q) HEPATITIS C ANTIBODY; Status:Active; Requested XVZ:[id number];    · (Q) LIPID PANEL WITH NON-HDL CHOLESTEROL; Status:Active; Requested  KCK:[id number];    · (Q) TSH, 3RD GENERATION; Status:Active; Requested WDX:61EIY1076; Hiatal hernia    · Famotidine 20 MG Oral Tablet    Discussion/Summary  healthy adult female Currently, she eats an adequate diet and has an inadequate exercise regimen  cervical cancer screening is current Breast cancer screening: the risks and benefits of breast cancer screening were discussed  Colorectal cancer screening: colorectal cancer screening is current  The patient declines immunizations  She was advised to be evaluated by an ophthalmologist  Advice and education were given regarding aerobic exercise and weight loss  She will return for fasting blood work  She is declining any immunizations  Paperwork is given regarding the 5 wishes  Chief Complaint  PATIENT IS HERE FOR ANNUAL PHYSICAL  Advance Directives  Advance Directive [facility]:   The patient is not in agreement to receive the Advance Care Planning service    NO - Patient does not have an advance health care directive  Capacity/Competence: This patient has full decision making capacity for discussion of advance care planning and This patient has full decision making competency for discussion of advance care planning  Summary of Advance Directive Conversation  paperwork given  History of Present Illness  HM, Adult Female:  The patient is being seen for a health maintenance evaluation  General Health: The patient's health since the last visit is described as good  She has regular dental visits  She denies vision problems  She denies hearing loss  Immunizations status: up to date  Lifestyle:  She consumes a diverse and healthy diet  She does not have any weight concerns  She exercises regularly  She does not use tobacco  She denies alcohol use  She denies drug use  Reproductive health:  she reports normal menses  Screening: cancer screening reviewed and current  metabolic screening reviewed and current  risk screening reviewed and current  HPI: here for well exam  saw cardiology in Jan  she's had no recurrence of her atrial fibrillation  Review of Systems    Constitutional: No fever, no chills, feels well, no tiredness, no recent weight gain or weight loss  Eyes: No complaints of eye pain, no red eyes, no eyesight problems, no discharge, no dry eyes, no itching of eyes  ENT: no complaints of earache, no loss of hearing, no nose bleeds, no nasal discharge, no sore throat, no hoarseness  Cardiovascular: No complaints of slow heart rate, no fast heart rate, no chest pain, no palpitations, no leg claudication, no lower extremity edema  Respiratory: No complaints of shortness of breath, no wheezing, no cough, no SOB on exertion, no orthopnea, no PND  Gastrointestinal: No complaints of abdominal pain, no constipation, no nausea or vomiting, no diarrhea, no bloody stools  Genitourinary: No complaints of dysuria, no incontinence, no pelvic pain, no dysmenorrhea, no vaginal discharge or bleeding  Musculoskeletal: No complaints of arthralgias, no myalgias, no joint swelling or stiffness, no limb pain or swelling  Integumentary: No complaints of skin rash or lesions, no itching, no skin wounds, no breast pain or lump     Neurological: No complaints of headache, no confusion, no convulsions, no numbness, no dizziness or fainting, no tingling, no limb weakness, no difficulty walking  Psychiatric: Not suicidal, no sleep disturbance, no anxiety or depression, no change in personality, no emotional problems  Endocrine: No complaints of proptosis, no hot flashes, no muscle weakness, no deepening of the voice, no feelings of weakness  Hematologic/Lymphatic: No complaints of swollen glands, no swollen glands in the neck, does not bleed easily, does not bruise easily  Active Problems    1  Anxiety (300 00) (F41 9)   2  Atrial fibrillation (427 31) (I48 91)   3  Bruit of left carotid artery (785 9) (R09 89)   4  Encounter for screening for malignant neoplasm of colon (V76 51) (Z12 11)   5  Encounter for screening mammogram for malignant neoplasm of breast (V76 12)   (Z12 31)   6  Encounter for vitamin deficiency screening (V77 99) (Z13 21)   7  Esophageal reflux (530 81) (K21 9)   8  Hiatal hernia (553 3) (K44 9)   9  Hyperlipidemia (272 4) (E78 5)   10  Hypothyroidism (244 9) (E03 9)   11  Joint pain (719 40) (M25 50)   12  Need for diphtheria-tetanus-pertussis (Tdap) vaccine, adult/adolescent (V06 1) (Z23)   13  Need for pneumococcal vaccination (V03 82) (Z23)   14  Osteoporosis (733 00) (M81 0)   15  S/P RF ablation operation for arrhythmia (V45 89) (Z98 890,Z86 79)   16  Screening for diabetes mellitus (V77 1) (Z13 1)   17  Screening for endocrine, metabolic and immunity disorder (V77 99)    (Z13 29,Z13 0,Z13 228)   18  Screening for lipoid disorders (V77 91) (Z13 220)   19  Screening for thyroid disorder (V77 0) (Z13 29)   20  Screening, anemia, deficiency, iron (V78 0) (Z13 0)   21  Varicose Veins Of Lower Extremities (454 9)   22   Vitamin D deficiency (268 9) (E55 9)    Past Medical History    · History of Encounter for screening mammogram for malignant neoplasm of breast  (V76 12) (Z12 31)   · History of Fibrocystic breast disease, unspecified laterality (610 1) (N60 19)   · History of Ganglion Of The Right Wrist (727 43)   · History of herpes zoster (V12 09) (Z86 19)   · History of postmenopausal hormone replacement therapy (V87 49) (Z92 29)   · History of Lyme disease (088 81) (A69 20)   · History of Screening for osteoporosis (V82 81) (Z13 820)    Surgical History    · History of Colonoscopy (Fiberoptic) Screening    Family History  Mother    · Family history of Diabetes Mellitus (V18 0)  Sister    · Family history of Cerebral Artery Aneurysm  Maternal Grandmother    · Family history of diabetes mellitus (V18 0) (Z83 3)  Family History    · Family history of cardiac disorder (V17 49) (Z82 49)   · Family history of Heart Disease (V17 49)    Social History    · Never a smoker   · Never Drank Alcohol    Current Meds   1  Calcium TABS; Therapy: (Recorded:74Fjb1152) to Recorded   2  Maya-C 500 MG TABS; TAKE 1 TABLET DAILY; Therapy: (Recorded:04Apr2016) to Recorded   3  Famotidine 20 MG Oral Tablet; TAKE 1 TABLET DAILY AS DIRECTED; Therapy: 65CDL2656 to (Evaluate:97Bey1946)  Requested for: 30Ucn3791; Last   Rx:12Jip9178 Ordered   4  Glucosamine Chondroitin TABS; Therapy: (Recorded:80Zut1953) to Recorded   5  Klor-Con M20 20 MEQ Oral Tablet Extended Release; TAKE 1 TABLET DAILY; Therapy: (Recorded:04Apr2016) to Recorded   6  Levothyroxine Sodium 25 MCG Oral Tablet; take 1 tablet by mouth every day; Therapy: 92HPA9389 to (Evaluate:28Jhr6448)  Requested for: 93Det6131; Last   Rx:84Vfz3071 Ordered   7  Magnesium CAPS; Therapy: (Recorded:78Yeu7604) to Recorded   8  Multivitamins Oral Capsule; Therapy: 60GDP1278 to Recorded   9  Omega-3 Fish Oil 1000 MG Oral Capsule; TAKE 1 CAPSULE DAILY; Therapy: 62ZAN4510 to Recorded   10  Potassium Chloride 20 MEQ/15ML (10%) Oral Solution; Therapy: 25YOR6453 to Recorded   11  Progesterone CREA; USE EXTERNALLY AS DIRECTED; Therapy: (Recorded:04Apr2016) to Recorded   12  Red Yeast Rice 600 MG Oral Capsule; Therapy: 01DGY7681 to Recorded   13  Vitamin D3 1000 UNIT Oral Capsule; TAKE AS DIRECTED;     Therapy: 48COZ2654 to Recorded   14  Vitamin D3 5000 UNIT Oral Capsule; 1 tablet daily; Therapy: (Recorded:04Apr2016) to Recorded    Allergies    1  No Known Drug Allergies    Vitals   Recorded: 56PDU7779 10:03AM   Temperature 98 9 F   Heart Rate 70   Systolic 688   Diastolic 80   Height 5 ft 4 in   Weight 199 lb 12 oz   BMI Calculated 34 29   BSA Calculated 1 95   O2 Saturation 96     Physical Exam    Constitutional   General appearance: No acute distress, well appearing and well nourished  Eyes   Conjunctiva and lids: No swelling, erythema or discharge  Pupils and irises: Equal, round and reactive to light  Ears, Nose, Mouth, and Throat   External inspection of ears and nose: Normal     Otoscopic examination: Tympanic membranes translucent with normal light reflex  Canals patent without erythema  Oropharynx: Normal with no erythema, edema, exudate or lesions  Pulmonary   Respiratory effort: No increased work of breathing or signs of respiratory distress  Auscultation of lungs: Clear to auscultation  Cardiovascular   Auscultation of heart: Normal rate and rhythm, normal S1 and S2, without murmurs  Examination of extremities for edema and/or varicosities: Normal     Abdomen   Abdomen: Non-tender, no masses  Liver and spleen: No hepatomegaly or splenomegaly  Lymphatic   Palpation of lymph nodes in neck: No lymphadenopathy  Musculoskeletal   Gait and station: Normal     Digits and nails: Normal without clubbing or cyanosis  Inspection/palpation of joints, bones, and muscles: Normal     Skin   Skin and subcutaneous tissue: Normal without rashes or lesions  Neurologic   Cranial nerves: Cranial nerves 2-12 intact  Reflexes: 2+ and symmetric  Sensation: No sensory loss      Psychiatric   Orientation to person, place, and time: Normal     Mood and affect: Normal        Signatures   Electronically signed by : Lon Flores DO; Jun 14 2017 10:30AM EST                       (Author)

## 2018-01-22 VITALS
TEMPERATURE: 98.6 F | DIASTOLIC BLOOD PRESSURE: 72 MMHG | HEIGHT: 64 IN | SYSTOLIC BLOOD PRESSURE: 110 MMHG | HEART RATE: 76 BPM | WEIGHT: 201.5 LBS | BODY MASS INDEX: 34.4 KG/M2

## 2018-02-09 ENCOUNTER — OFFICE VISIT (OUTPATIENT)
Dept: PAIN MEDICINE | Facility: CLINIC | Age: 68
End: 2018-02-09
Payer: COMMERCIAL

## 2018-02-09 VITALS
HEART RATE: 101 BPM | SYSTOLIC BLOOD PRESSURE: 112 MMHG | WEIGHT: 210.2 LBS | HEIGHT: 65 IN | DIASTOLIC BLOOD PRESSURE: 80 MMHG | BODY MASS INDEX: 35.02 KG/M2 | TEMPERATURE: 97.7 F

## 2018-02-09 DIAGNOSIS — M79.2 NEUROPATHIC PAIN: ICD-10-CM

## 2018-02-09 DIAGNOSIS — M54.50 CHRONIC RIGHT-SIDED LOW BACK PAIN WITHOUT SCIATICA: ICD-10-CM

## 2018-02-09 DIAGNOSIS — G89.4 PAIN SYNDROME, CHRONIC: ICD-10-CM

## 2018-02-09 DIAGNOSIS — B02.29 POST HERPETIC NEURALGIA: ICD-10-CM

## 2018-02-09 DIAGNOSIS — G89.29 CHRONIC RIGHT-SIDED THORACIC BACK PAIN: Primary | ICD-10-CM

## 2018-02-09 DIAGNOSIS — M54.6 CHRONIC RIGHT-SIDED THORACIC BACK PAIN: Primary | ICD-10-CM

## 2018-02-09 DIAGNOSIS — G89.29 CHRONIC RIGHT-SIDED LOW BACK PAIN WITHOUT SCIATICA: ICD-10-CM

## 2018-02-09 DIAGNOSIS — M89.8X1 PAIN OF RIGHT SCAPULA: ICD-10-CM

## 2018-02-09 PROBLEM — M25.519 SHOULDER PAIN: Status: ACTIVE | Noted: 2017-08-09

## 2018-02-09 PROBLEM — I80.00 PHLEBITIS OF SUPERFICIAL VEIN OF LOWER EXTREMITY: Status: ACTIVE | Noted: 2017-08-02

## 2018-02-09 PROCEDURE — 99213 OFFICE O/P EST LOW 20 MIN: CPT | Performed by: NURSE PRACTITIONER

## 2018-02-09 RX ORDER — GABAPENTIN 100 MG/1
CAPSULE ORAL
Qty: 60 CAPSULE | Refills: 1 | Status: SHIPPED | OUTPATIENT
Start: 2018-02-09 | End: 2018-09-25

## 2018-02-09 RX ORDER — GABAPENTIN 300 MG/1
CAPSULE ORAL
COMMUNITY
Start: 2017-08-09 | End: 2018-02-09 | Stop reason: SDUPTHER

## 2018-02-09 RX ORDER — AMPICILLIN TRIHYDRATE 250 MG
600 CAPSULE ORAL
COMMUNITY
Start: 2013-05-03

## 2018-02-09 RX ORDER — METOPROLOL SUCCINATE 25 MG/1
1 TABLET, EXTENDED RELEASE ORAL DAILY
COMMUNITY

## 2018-02-09 RX ORDER — FLECAINIDE ACETATE 50 MG/1
1 TABLET ORAL 2 TIMES DAILY
COMMUNITY
End: 2019-01-14 | Stop reason: ALTCHOICE

## 2018-02-09 NOTE — PROGRESS NOTES
Assessment:  1  Chronic right-sided thoracic back pain    2  Chronic right-sided low back pain without sciatica    3  Pain of right scapula    4  Pain syndrome, chronic    5  Neuropathic pain    6  Post herpetic neuralgia        Plan:  While the patient was in the office today, I discussed with the patient at this point time since she is noting such significant relief, despite the fact that we have continue to titrate down on the gabapentin and it is still her wishes, we will decrease the gabapentin down to 200 mg a day for the next 3 weeks and then she is to decrease it down to 100 mg a day for 4 weeks and then stop the gabapentin altogether  However, I did advised her that if she feels her pain is worsening and would like to continue the gabapentin or go back up on the dosage, she should call our office and we can send new prescriptions and proceed with follow-up from there  The patient was agreeable and verbalized an understanding  The patient will follow-up as needed in the next 3-4 months and is to call if her pain changes or worsens  History of Present Illness: The patient is a 74 y o  female last seen on 11/17/2017 who presents for a follow up office visit in regards to chronic pain secondary to post herpetic neuralgia with right sided thoracic and scapular pain  The patient currently reports that her pain has remained stable since her last office visit despite decreasing it down to 300 mg HS as it is still her goal to come off of the gabapentin all together  She presents today to discuss her medication regimen and treatment plan  Current pain medications includes: Gabapentin 300 mg HS   The patient reports that this regimen is providing 95% pain relief  The patient is reporting no side effects from this pain medication regimen      I have personally reviewed and/or updated the patient's past medical history, past surgical history, family history, social history, current medications, allergies, and vital signs today  Review of Systems:    Review of Systems   Respiratory: Negative for shortness of breath  Cardiovascular: Negative for chest pain  Gastrointestinal: Negative for constipation, diarrhea, nausea and vomiting  Musculoskeletal: Negative for arthralgias, gait problem, joint swelling and myalgias  Skin: Negative for rash  Neurological: Positive for dizziness  Negative for seizures and weakness  All other systems reviewed and are negative  Past Medical History:   Diagnosis Date    Disease of thyroid gland     Fibrocystic breast disease     Ganglion cyst of wrist, right     Herpes zoster     Lyme disease     Postmenopausal hormone replacement therapy        Past Surgical History:   Procedure Laterality Date    BREAST BIOPSY      CARDIAC SURGERY      COLONOSCOPY         Family History   Problem Relation Age of Onset    Diabetes Mother     Cerebral aneurysm Sister     Diabetes Maternal Grandmother     Heart disease Family        Social History     Occupational History    Not on file       Social History Main Topics    Smoking status: Never Smoker    Smokeless tobacco: Never Used    Alcohol use No    Drug use: No    Sexual activity: Not on file         Current Outpatient Prescriptions:     Bioflavonoid Products (SUNI C PO), Take 500 mg by mouth daily, Disp: , Rfl:     cholecalciferol (VITAMIN D3) 1,000 units tablet, Take 5,000 Units by mouth daily, Disp: , Rfl:     flecainide (TAMBOCOR) 50 mg tablet, Take 1 tablet by mouth 2 (two) times a day, Disp: , Rfl:     gabapentin (NEURONTIN) 300 mg capsule, Take by mouth, Disp: , Rfl:     glucosamine-chondroitin 500-400 MG tablet, Take 1 tablet by mouth daily, Disp: , Rfl:     levothyroxine 25 mcg tablet, Take 25 mcg by mouth daily, Disp: , Rfl:     magnesium aspartate (MAGINEX) 615 MG tablet, Take 200 mg by mouth daily, Disp: , Rfl:     metoprolol succinate (TOPROL-XL) 25 mg 24 hr tablet, Take 1 tablet by mouth daily, Disp: , Rfl:     Multiple Vitamin (MULTIVITAMIN) tablet, Take 2 tablets by mouth daily, Disp: , Rfl:     Omega-3 Fatty Acids (FISH OIL) 1200 MG CAPS, Take 1 capsule by mouth daily, Disp: , Rfl:     potassium chloride (KLOR-CON) 20 mEq packet, Take 20 mEq by mouth daily, Disp: , Rfl:     Progesterone 40 % CREA, Apply topically, Disp: , Rfl:     Red Yeast Rice 600 MG CAPS, Take by mouth, Disp: , Rfl:     rivaroxaban (XARELTO) 20 mg tablet, Take 1 tablet by mouth, Disp: , Rfl:     traMADol (ULTRAM) 50 mg tablet, Take 1 tablet by mouth every 8 (eight) hours as needed for moderate pain, Disp: 9 tablet, Rfl: 0    No Known Allergies    Physical Exam:    /80   Pulse 101   Temp 97 7 °F (36 5 °C) (Oral)   Ht 5' 5" (1 651 m)   Wt 95 3 kg (210 lb 3 2 oz)   BMI 34 98 kg/m²     Constitutional:normal, well developed, well nourished, alert, in no distress and non-toxic and no overt pain behavior  Eyes:anicteric  HEENT:grossly intact  Neck:supple, symmetric, trachea midline and no masses   Pulmonary:even and unlabored  Cardiovascular:No edema or pitting edema present  Skin:Normal without rashes or lesions and well hydrated  Psychiatric:Mood and affect appropriate  Neurologic:Cranial Nerves II-XII grossly intact  Musculoskeletal:normal      Imaging  No orders to display         No orders of the defined types were placed in this encounter

## 2018-02-13 RX ORDER — GABAPENTIN 300 MG/1
CAPSULE ORAL
Qty: 30 CAPSULE | Refills: 2 | OUTPATIENT
Start: 2018-02-13

## 2018-04-06 DIAGNOSIS — M79.2 NEUROPATHIC PAIN: ICD-10-CM

## 2018-04-06 DIAGNOSIS — B02.29 POST HERPETIC NEURALGIA: ICD-10-CM

## 2018-04-06 RX ORDER — GABAPENTIN 100 MG/1
CAPSULE ORAL
Qty: 60 CAPSULE | Refills: 1 | OUTPATIENT
Start: 2018-04-06

## 2018-06-21 DIAGNOSIS — E03.9 HYPOTHYROIDISM, UNSPECIFIED TYPE: Primary | ICD-10-CM

## 2018-06-21 RX ORDER — LEVOTHYROXINE SODIUM 0.03 MG/1
TABLET ORAL
Qty: 90 TABLET | Refills: 2 | Status: SHIPPED | OUTPATIENT
Start: 2018-06-21 | End: 2019-03-15 | Stop reason: SDUPTHER

## 2018-09-25 ENCOUNTER — OFFICE VISIT (OUTPATIENT)
Dept: FAMILY MEDICINE CLINIC | Facility: CLINIC | Age: 68
End: 2018-09-25
Payer: COMMERCIAL

## 2018-09-25 VITALS
SYSTOLIC BLOOD PRESSURE: 132 MMHG | TEMPERATURE: 98.7 F | DIASTOLIC BLOOD PRESSURE: 82 MMHG | WEIGHT: 210 LBS | HEIGHT: 65 IN | BODY MASS INDEX: 34.99 KG/M2 | OXYGEN SATURATION: 98 % | HEART RATE: 72 BPM

## 2018-09-25 DIAGNOSIS — Z12.11 SCREENING FOR COLON CANCER: ICD-10-CM

## 2018-09-25 DIAGNOSIS — R39.9 URINARY TRACT INFECTION SYMPTOMS: ICD-10-CM

## 2018-09-25 DIAGNOSIS — E78.2 MIXED HYPERLIPIDEMIA: ICD-10-CM

## 2018-09-25 DIAGNOSIS — N30.00 ACUTE CYSTITIS WITHOUT HEMATURIA: Primary | ICD-10-CM

## 2018-09-25 DIAGNOSIS — E03.9 ACQUIRED HYPOTHYROIDISM: ICD-10-CM

## 2018-09-25 LAB
SL AMB  POCT GLUCOSE, UA: NEGATIVE
SL AMB LEUKOCYTE ESTERASE,UA: ABNORMAL
SL AMB POCT BILIRUBIN,UA: NEGATIVE
SL AMB POCT BLOOD,UA: ABNORMAL
SL AMB POCT CLARITY,UA: ABNORMAL
SL AMB POCT COLOR,UA: YELLOW
SL AMB POCT KETONES,UA: NEGATIVE
SL AMB POCT NITRITE,UA: NEGATIVE
SL AMB POCT PH,UA: 6
SL AMB POCT SPECIFIC GRAVITY,UA: 1.02
SL AMB POCT URINE PROTEIN: ABNORMAL
SL AMB POCT UROBILINOGEN: 0.2

## 2018-09-25 PROCEDURE — 1036F TOBACCO NON-USER: CPT | Performed by: FAMILY MEDICINE

## 2018-09-25 PROCEDURE — 99213 OFFICE O/P EST LOW 20 MIN: CPT | Performed by: FAMILY MEDICINE

## 2018-09-25 PROCEDURE — 1160F RVW MEDS BY RX/DR IN RCRD: CPT | Performed by: FAMILY MEDICINE

## 2018-09-25 PROCEDURE — 81002 URINALYSIS NONAUTO W/O SCOPE: CPT | Performed by: FAMILY MEDICINE

## 2018-09-25 PROCEDURE — 3008F BODY MASS INDEX DOCD: CPT | Performed by: FAMILY MEDICINE

## 2018-09-25 RX ORDER — SULFAMETHOXAZOLE AND TRIMETHOPRIM 800; 160 MG/1; MG/1
1 TABLET ORAL EVERY 12 HOURS SCHEDULED
Qty: 6 TABLET | Refills: 0 | Status: SHIPPED | OUTPATIENT
Start: 2018-09-25 | End: 2018-09-28

## 2018-09-25 NOTE — PATIENT INSTRUCTIONS

## 2018-09-25 NOTE — PROGRESS NOTES
Assessment/Plan:    No problem-specific Assessment & Plan notes found for this encounter  Diagnoses and all orders for this visit:    Acute cystitis without hematuria  -     sulfamethoxazole-trimethoprim (BACTRIM DS) 800-160 mg per tablet; Take 1 tablet by mouth every 12 (twelve) hours for 3 days    I suspect that the pt has a UTI  I prescribed antibiotics and will send the urine for culture  Urinary tract infection symptoms  -     POCT urine dip  -     CBC and differential; Future    Mixed hyperlipidemia  -     Comprehensive metabolic panel; Future  -     Lipid Panel with Direct LDL reflex; Future    Acquired hypothyroidism  -     TSH, 3rd generation with Free T4 reflex; Future    The pt is due for labs and her AWV  Labs and AWV paperwork provided  Subjective:   Chief Complaint   Patient presents with    Urinary Tract Infection     PT COMES IN WITH URINARY TRACT SYMPTOMS  PT HAS BEEN STAYING WITH HER  AT [facility]  PT GIVEN AWV FORMS TO SCHEDULE  DEFERS FLU VACCINE AT THIS TIME  COLONOSCOPY SENT TO [facility]  Patient ID: Laura Waddell is a 79 y o  female  Patient is here today with symptoms of a urinary tract infection  she has had urinary tract infections in the past but not at all recently  sx for 2-3 days  + urinary frequency  + urgency  + dysuria   no hematuria  no flank pain  no fever/chills  no n/v            The following portions of the patient's history were reviewed and updated as appropriate: allergies, current medications, past family history, past medical history, past social history, past surgical history and problem list     Review of Systems      Objective:  Vitals:    09/25/18 1402   BP: 132/82   Pulse: 72   Temp: 98 7 °F (37 1 °C)   SpO2: 98%   Weight: 95 3 kg (210 lb)   Height: 5' 5" (1 651 m)      Physical Exam   Constitutional: Vital signs are normal  She appears well-developed and well-nourished  She does not appear ill  Abdominal: Soft  Normal appearance  There is no CVA tenderness  Skin: Skin is warm, dry and intact

## 2019-01-14 ENCOUNTER — OFFICE VISIT (OUTPATIENT)
Dept: FAMILY MEDICINE CLINIC | Facility: CLINIC | Age: 69
End: 2019-01-14
Payer: COMMERCIAL

## 2019-01-14 VITALS
HEART RATE: 58 BPM | HEIGHT: 65 IN | DIASTOLIC BLOOD PRESSURE: 72 MMHG | OXYGEN SATURATION: 98 % | SYSTOLIC BLOOD PRESSURE: 118 MMHG | BODY MASS INDEX: 35.99 KG/M2 | TEMPERATURE: 97.2 F | WEIGHT: 216 LBS

## 2019-01-14 DIAGNOSIS — M67.911 DYSFUNCTION OF RIGHT ROTATOR CUFF: Primary | ICD-10-CM

## 2019-01-14 PROBLEM — M25.519 SHOULDER PAIN: Status: RESOLVED | Noted: 2017-08-09 | Resolved: 2019-01-14

## 2019-01-14 PROCEDURE — 99214 OFFICE O/P EST MOD 30 MIN: CPT | Performed by: FAMILY MEDICINE

## 2019-01-14 RX ORDER — POTASSIUM CHLORIDE 1500 MG/1
20 TABLET, EXTENDED RELEASE ORAL DAILY
Refills: 1 | COMMUNITY
Start: 2019-01-10

## 2019-01-14 RX ORDER — FLECAINIDE ACETATE 100 MG/1
100 TABLET ORAL 2 TIMES DAILY
Refills: 6 | COMMUNITY
Start: 2018-12-29

## 2019-01-14 NOTE — PROGRESS NOTES
Subjective:   Chief Complaint   Patient presents with    Shoulder Pain     pt is here today for pain in her right shoulder which started in October  She went to the chiropractor and they suggested she get an MRI  Chiropractor thinks it is her rotator cuff  Pt remembers using a sledge hammer and having the pain since  Pt states that laying on it hurts and it will pop out  Patient ID: Shena Agudelo is a 79 y o  female  The pt is here today with right shoulder pain since October  It happened after she had been swinging a sledgehammer  If she lays on the right and turns it snaps and pops  She has a very hard time sleeping - pain is severe when she lies on the shoulder  She has radiation into the upper arm  Leaning back against the chair hurts  She has used ice, heat, otc meds  Nothing takes the pain away  Her  had lidocaine patches - she tried that and it did give her some relief  She has been going to a chiropractor regularly for months  She has not gotten any better  [employer] [facility]  The chiropractor feel she likely has a tear anything MRI at this point            The following portions of the patient's history were reviewed and updated as appropriate: allergies, current medications, past family history, past medical history, past social history, past surgical history and problem list     Review of Systems      Objective:  Vitals:    01/14/19 0916   BP: 118/72   Pulse: 58   Temp: (!) 97 2 °F (36 2 °C)   SpO2: 98%   Weight: 98 kg (216 lb)   Height: 5' 5" (1 651 m)      Physical Exam   Constitutional: She is oriented to person, place, and time  She appears well-developed and well-nourished  No distress  Musculoskeletal:        Right shoulder: She exhibits decreased range of motion, tenderness, pain and decreased strength  She exhibits no swelling and no spasm  Neurological: She is alert and oriented to person, place, and time  No cranial nerve deficit   Coordination normal    Skin: Skin is warm and dry  No rash noted  She is not diaphoretic  No erythema  Psychiatric: She has a normal mood and affect  Her behavior is normal  Judgment and thought content normal          Assessment/Plan:    No problem-specific Assessment & Plan notes found for this encounter  Diagnoses and all orders for this visit:    Dysfunction of right rotator cuff  -     MRI shoulder right wo contrast; Future    I do agree that the patient likely needs an MRI  I had her sign a release of information so that we can get the records from her chiropractor as the MRI will likely need prior authorization  They may very well want the records of her prior treatment before they approved the MRI  I gave her the prescription for the MRI and advised that she wait to schedule it until she hears from us about the prior authorization  She declines anything for pain at this point  She would really like to take Aleve but her cardiologist has told her that she may not take any anti-inflammatories ever  I offered her tramadol or Vicodin for nighttime and she declines

## 2019-01-14 NOTE — PATIENT INSTRUCTIONS
Rotator Cuff Injury   WHAT YOU NEED TO KNOW:   A rotator cuff injury is damage to the muscles or tendons of your rotator cuff  The rotator cuff is made up of a group of muscles and tendons that hold the shoulder joint in place  The damage may include stretching of your muscle or tears in the tendons  It may also include inflammation of the bursa (small sack of fluid around the joint)  DISCHARGE INSTRUCTIONS:   · Acetaminophen: This medicine decreases pain  Acetaminophen is available without a doctor's order  Ask how much to take and how often to take it  Follow directions  Acetaminophen can cause liver damage if not taken correctly  · NSAIDs:  These medicines decrease swelling, pain, and fever  NSAIDs are available without a doctor's order  Ask your healthcare provider which medicine is right for you  Ask how much to take and when to take it  Take as directed  NSAIDs can cause stomach bleeding or kidney problems if not taken correctly  · Pain medicine: You may be given a prescription medicine to decrease pain  Do not wait until the pain is severe before you take this medicine  · Steroids: This medicine may be injected into the rotator cuff area to decrease inflammation and pain  · Take your medicine as directed  Contact your healthcare provider if you think your medicine is not helping or if you have side effects  Tell him of her if you are allergic to any medicine  Keep a list of the medicines, vitamins, and herbs you take  Include the amounts, and when and why you take them  Bring the list or the pill bottles to follow-up visits  Carry your medicine list with you in case of an emergency  Follow up with your healthcare provider or orthopedist as directed:  Write down your questions so you remember to ask them during your visits  Physical therapy:  A physical therapist can teach you exercises to help improve movement and strength, and to decrease pain   These exercises may help you go back to your usual activities or return to playing sports  Self-care:   · Rest:  Rest may help your shoulder heal  Overuse of your shoulder can make your injury worse  Avoid heavy lifting, using your arms over your head, or any other activity that makes the pain worse  · Put ice or heat on your shoulder:  Use ice on your shoulder every few hours for the first several days  This may help decrease pain and swelling  After the first several days, a heating pad may help relax the muscles in your shoulder  Contact your healthcare provider or orthopedist if:   · The pain in your shoulder or arm is not improving, or is worse than before you started treatment  · You have new pain in your neck  · You have a fever  · You have questions or concerns about your condition or care  Return to the emergency department if:  · You suddenly cannot move your arm  · You have severe stomach or back pain, are vomiting blood, or have black bowel movements  © 2017 [employer]  Information is for End User's use only and may not be sold, redistributed or otherwise used for commercial purposes  All illustrations and images included in CareNotes® are the copyrighted property of [employer]  or Michele Edouard  The above information is an  only  It is not intended as medical advice for individual conditions or treatments  Talk to your doctor, nurse or pharmacist before following any medical regimen to see if it is safe and effective for you

## 2019-01-15 ENCOUNTER — TELEPHONE (OUTPATIENT)
Dept: FAMILY MEDICINE CLINIC | Facility: CLINIC | Age: 69
End: 2019-01-15

## 2019-01-22 ENCOUNTER — TELEPHONE (OUTPATIENT)
Dept: FAMILY MEDICINE CLINIC | Facility: CLINIC | Age: 69
End: 2019-01-22

## 2019-01-22 DIAGNOSIS — S46.001S INJURY OF RIGHT ROTATOR CUFF, SEQUELA: Primary | ICD-10-CM

## 2019-01-22 NOTE — TELEPHONE ENCOUNTER
[employer] [address] [id number] [place]    She transferred me to [id number] for nurse evaluation on the phone  She will send to dr nj  Told them we needed them ASAP  Case ID [id number]    Notified pt and Nerissa at [facility]    (nerissa no

## 2019-01-23 NOTE — TELEPHONE ENCOUNTER
Please let the patient know that the quickest way to get things accomplished at this point would be for her to see the orthopedist   She is going to need to see them anyway for whatever needs to be done with the shoulder  Please give her the number for [facility]  I will place a referral in her chart  They should be able to see her within a week, if there is a problem or she is unable to get in quickly she should call and let me know at which point I will get her in

## 2019-01-23 NOTE — TELEPHONE ENCOUNTER
Is this a nurse or doctor review? Ramon Garcia can you determine if it I need to call? Did we fax all of the chiropractor records?

## 2019-01-24 ENCOUNTER — TELEPHONE (OUTPATIENT)
Dept: FAMILY MEDICINE CLINIC | Facility: CLINIC | Age: 69
End: 2019-01-24

## 2019-01-24 NOTE — TELEPHONE ENCOUNTER
Gave patient info  She wants to do some research on her own first before deciding which ortho specialist she would like to see, possibly will be going out of [facility] network  She will call and let us know who she ends up choosing to see

## 2019-01-24 NOTE — TELEPHONE ENCOUNTER
PATIENT CALLED TO ADVISE YOU SHE HAS APPT 2/4 AT [facility] IN [place] SINCE MRI WAS NOT APPROVED

## 2019-01-25 NOTE — TELEPHONE ENCOUNTER
This is the wrong specialty - this is pain management and not orthopedics    She needs to see an orthopedist

## 2019-01-31 ENCOUNTER — TELEPHONE (OUTPATIENT)
Dept: FAMILY MEDICINE CLINIC | Facility: CLINIC | Age: 69
End: 2019-01-31

## 2019-02-01 NOTE — TELEPHONE ENCOUNTER
I RECEIVED A PHONE CALL FROM PATIENT'S INSURANCE COMPANY THAT HER MRI WAS DENIED  I DID NOT ADVISE PATIENT YET   THEY WILL BE FAXING DENIAL  DO YOU HAVE ANY FURTHER INSTRUCTIONS FOR PATIENT AT THIS TIME?

## 2019-02-01 NOTE — TELEPHONE ENCOUNTER
This patient has already been advised that her MRI was denied  She is aware of it  She should be seen an orthopedic surgeon who will then be able to order the MRI  There have been multiple phone calls made  I do not necessarily think we need to call her again

## 2019-02-06 ENCOUNTER — OFFICE VISIT (OUTPATIENT)
Dept: OBGYN CLINIC | Facility: CLINIC | Age: 69
End: 2019-02-06
Payer: COMMERCIAL

## 2019-02-06 ENCOUNTER — APPOINTMENT (OUTPATIENT)
Dept: RADIOLOGY | Facility: CLINIC | Age: 69
End: 2019-02-06
Payer: COMMERCIAL

## 2019-02-06 VITALS
SYSTOLIC BLOOD PRESSURE: 124 MMHG | DIASTOLIC BLOOD PRESSURE: 81 MMHG | BODY MASS INDEX: 35.49 KG/M2 | WEIGHT: 213 LBS | HEIGHT: 65 IN | HEART RATE: 64 BPM

## 2019-02-06 DIAGNOSIS — S46.001S INJURY OF RIGHT ROTATOR CUFF, SEQUELA: ICD-10-CM

## 2019-02-06 DIAGNOSIS — M25.511 RIGHT SHOULDER PAIN, UNSPECIFIED CHRONICITY: ICD-10-CM

## 2019-02-06 DIAGNOSIS — M25.511 RIGHT SHOULDER PAIN, UNSPECIFIED CHRONICITY: Primary | ICD-10-CM

## 2019-02-06 PROCEDURE — 73030 X-RAY EXAM OF SHOULDER: CPT

## 2019-02-06 PROCEDURE — 20610 DRAIN/INJ JOINT/BURSA W/O US: CPT | Performed by: ORTHOPAEDIC SURGERY

## 2019-02-06 PROCEDURE — 99203 OFFICE O/P NEW LOW 30 MIN: CPT | Performed by: ORTHOPAEDIC SURGERY

## 2019-02-06 RX ORDER — LIDOCAINE HYDROCHLORIDE 10 MG/ML
3 INJECTION, SOLUTION INFILTRATION; PERINEURAL
Status: COMPLETED | OUTPATIENT
Start: 2019-02-06 | End: 2019-02-06

## 2019-02-06 RX ORDER — METHYLPREDNISOLONE ACETATE 40 MG/ML
2 INJECTION, SUSPENSION INTRA-ARTICULAR; INTRALESIONAL; INTRAMUSCULAR; SOFT TISSUE
Status: COMPLETED | OUTPATIENT
Start: 2019-02-06 | End: 2019-02-06

## 2019-02-06 RX ADMIN — LIDOCAINE HYDROCHLORIDE 3 ML: 10 INJECTION, SOLUTION INFILTRATION; PERINEURAL at 11:47

## 2019-02-06 RX ADMIN — METHYLPREDNISOLONE ACETATE 2 ML: 40 INJECTION, SUSPENSION INTRA-ARTICULAR; INTRALESIONAL; INTRAMUSCULAR; SOFT TISSUE at 11:47

## 2019-02-06 NOTE — PROGRESS NOTES
Ortho Sports Medicine Shoulder Visit     Assesment:   right shoulder pain, rotator cuff tear    Plan:    Conservative treatment:      Ice to shoulder 1-2 times daily, for 20 minutes at a time  PT for ROM and strengthening to shoulder, rotator cuff, scapular stabilizers  Let pain guide return to activities  Imaging: All imaging from today was reviewed by myself and explained to the patient  Consider MRI R Shoulder at next visit if pain continues  Injection:    The risks and benefits of the injection (which include but are not limited to: infection, bleeding,damage to nerve/artery, need for further intervention), as well as the risks and benefits of all alternative treatments were explained and understood  The patient elected to proceed with injection  The procedure was done with aseptic technique, and the patient tolerated the procedure well with no complications  A corticosteroid injection of the subacromial space was performed  The patient should take 1-2 days off of activity, with gradual return to activity as tolerated  Ice to the shoulder 1-2 times daily for 20 minutes, for next 24-48 hrs  Surgery:     No surgery is recommended at this point, continue with conservative treatment plan as noted  History of Present Illness: The patient is a 79 y o , right hand dominant 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, referred to me by their primary care physician, seen in clinic for consultation of right shoulder pain  The patient denies a history of diabetes  The patient has a history of thyroid disorder  Pain is located anterior  The patient rates the pain as a 6/10  The pain has been present for a few months  The patient denies a specific injury but states she was frequently hammering heavy wood fence posts in last fall  She also works with her hands a lot as a  and   The pain is characterized as dull  The pain is present daily        Pain is improved by rest, ice and heat  She   Pain is aggravated by overhead activity and reaching back  Symptoms include popping  The patient has weakness  The patient denies numbness and tingling  The patient has tried rest and ice  She cannot take NSAIDs due to past medical history of AFib, she currently takes Xarelto daily  Shoulder Surgical History:  None    Past Medical, Social and Family History:  Past Medical History:   Diagnosis Date    Disease of thyroid gland     Fibrocystic breast disease     Ganglion cyst of wrist, right     Herpes zoster     Lyme disease     Postmenopausal hormone replacement therapy      Past Surgical History:   Procedure Laterality Date    BREAST BIOPSY      CARDIAC SURGERY      COLONOSCOPY       No Known Allergies  Current Outpatient Prescriptions on File Prior to Visit   Medication Sig Dispense Refill    Bioflavonoid Products (SUNI C PO) Take 500 mg by mouth daily      cholecalciferol (VITAMIN D3) 1,000 units tablet Take 5,000 Units by mouth daily      flecainide (TAMBOCOR) 100 mg tablet Take 100 mg by mouth 2 (two) times a day  6    glucosamine-chondroitin 500-400 MG tablet Take 1 tablet by mouth daily      KLOR-CON M20 20 MEQ tablet Take 20 mEq by mouth daily  1    levothyroxine 25 mcg tablet TAKE 1 TABLET BY MOUTH EVERY DAY 90 tablet 2    magnesium aspartate (MAGINEX) 615 MG tablet Take 200 mg by mouth daily      metoprolol succinate (TOPROL-XL) 25 mg 24 hr tablet Take 1 tablet by mouth daily      Multiple Vitamin (MULTIVITAMIN) tablet Take 2 tablets by mouth daily      Progesterone 40 % CREA Apply topically      Red Yeast Rice 600 MG CAPS Take 600 capsules by mouth        rivaroxaban (XARELTO) 20 mg tablet Take 1 tablet by mouth       No current facility-administered medications on file prior to visit        Social History     Social History    Marital status: /Civil Union     Spouse name: N/A    Number of children: N/A    Years of education: N/A Occupational History    Not on file  Social History Main Topics    Smoking status: Never Smoker    Smokeless tobacco: Never Used    Alcohol use No    Drug use: No    Sexual activity: Not on file     Other Topics Concern    Not on file     Social History Narrative    No narrative on file         I have reviewed the past medical, surgical, social and family history, medications and allergies as documented in the EMR  Review of systems: ROS is negative other than that noted in the HPI  Constitutional: Negative for fatigue and fever  HENT: Negative for sore throat  Respiratory: Negative for shortness of breath  Cardiovascular: Negative for chest pain  Gastrointestinal: Negative for abdominal pain  Endocrine: Negative for cold intolerance and heat intolerance  Genitourinary: Negative for flank pain  Musculoskeletal: Negative for back pain  Skin: Negative for rash  Allergic/Immunologic: Negative for immunocompromised state  Neurological: Negative for dizziness  Psychiatric/Behavioral: Negative for agitation  Physical Exam:    Blood pressure 124/81, pulse 64, height 5' 5" (1 651 m), weight 96 6 kg (213 lb)  General/Constitutional: NAD, well developed, well nourished  HENT: Normocephalic, atraumatic  CV: Intact distal pulses, regular rate  Resp: No respiratory distress or labored breathing  Lymphatic: No lymphadenopathy palpated  Neuro: Alert and Oriented x 3, no focal deficits  Psych: Normal mood, normal affect, normal judgement, normal behavior  Skin: Warm, dry, no rashes, no erythema     Shoulder Exam (focused):     Shoulder focused exam:       RIGHT LEFT    Scapula Atrophy Negative Negative     Winging Negative Negative     Protraction Negative Negative    Rotator cuff SS 4/5 5/5     IS 4/5 5/5     SubS 5/5 5/5    ROM  170     ER0 60 60     ER90 90 90     IR90 40 40     IRb T6 T6    TTP: AC Negative Negative     Biceps Negative Negative     Coracoid Negative Negative    Special Tests: O'Briens Not Applicable Negative     Lee-shear Negative Negative     Cross body Adduction Negative Negative     Speeds  Negative Negative     Marie's Negative Negative     Whipple Positive Negative       Neer Negative Negative     Morales Negative Negative    Instability: Apprehension & relocation not tested not tested     Load & shift not tested not tested    Other: Crank Negative Negative               UE NV Exam: +2 Radial pulses bilaterally  Sensation intact to light touch C5-T1 bilaterally, Radial/median/ulnar nerve motor intact      Bilateral elbow, wrist, and and forearm ROM full, painless with passive ROM, no ttp or crepitance throughout extremities below shoulder joint    Cervical ROM is full without pain, numbness or tingling    Shoulder Imaging    X-rays of the right shoulder were reviewed, which demonstrate minimal GH DJD with moderate AC joint OA  I have reviewed the radiology report and do not currently have a radiology reading from [facility], but will check the result once the reading is performed        Large joint arthrocentesis  Date/Time: 2/6/2019 11:47 AM  Consent given by: patient  Site marked: site marked  Timeout: Immediately prior to procedure a time out was called to verify the correct patient, procedure, equipment, support staff and site/side marked as required   Supporting Documentation  Indications: pain   Procedure Details  Location: shoulder - R subacromial bursa  Preparation: Patient was prepped and draped in the usual sterile fashion  Needle size: 22 G  Ultrasound guidance: no  Approach: posterior  Medications administered: 3 mL lidocaine 1 %; 2 mL methylPREDNISolone acetate 40 mg/mL    Patient tolerance: patient tolerated the procedure well with no immediate complications  Dressing:  Sterile dressing applied

## 2019-02-06 NOTE — PATIENT INSTRUCTIONS
Continue OTC pain relievers as needed for pain  PT script was given to you at check out  You can go to whatever physical therapy office is close and convenient to you  See you in 4-6 weeks!

## 2019-02-08 ENCOUNTER — TELEPHONE (OUTPATIENT)
Dept: FAMILY MEDICINE CLINIC | Facility: CLINIC | Age: 69
End: 2019-02-08

## 2019-02-13 ENCOUNTER — EVALUATION (OUTPATIENT)
Dept: PHYSICAL THERAPY | Facility: CLINIC | Age: 69
End: 2019-02-13
Payer: COMMERCIAL

## 2019-02-13 DIAGNOSIS — M25.511 RIGHT SHOULDER PAIN, UNSPECIFIED CHRONICITY: Primary | ICD-10-CM

## 2019-02-13 DIAGNOSIS — S46.001S INJURY OF RIGHT ROTATOR CUFF, SEQUELA: ICD-10-CM

## 2019-02-13 DIAGNOSIS — M25.511 ACUTE PAIN OF RIGHT SHOULDER: ICD-10-CM

## 2019-02-13 PROCEDURE — 97162 PT EVAL MOD COMPLEX 30 MIN: CPT | Performed by: PHYSICAL THERAPIST

## 2019-02-13 NOTE — PROGRESS NOTES
PT Evaluation     Today's date: 2019  Patient name: Chana Dent  : 1950  MRN: [id number]  Referring provider: Logan Mcgrath  Dx:   Encounter Diagnosis     ICD-10-CM    1  Right shoulder pain, unspecified chronicity M25 511 Ambulatory referral to Physical Therapy   2  Injury of right rotator cuff, sequela S46 001S Ambulatory referral to Physical Therapy                  Assessment  Assessment details: Pt is a 79y o  year old female coming to outpatient PT with a diagnosis of R shoulder pain/ RTC dysfunction  Pt presents with increased pain and tenderness to palpation, decreased R shoulder ROM, decreased R shoulder strength and overall decreased functional mobility  Pt would benefit from skilled PT services in order to address these deficits and reach maximum level of function  Thank you kindly for the referral!  Impairments: abnormal or restricted ROM, impaired physical strength, lacks appropriate home exercise program and pain with function  Understanding of Dx/Px/POC: good   Prognosis: good    Goals  STG's ( 3-4 weeks)  1  Pt will be independent in HEP  2  Improve R shoulder ROM by 10*-15*  LTG's ( 6- 8 weeks)  1  Improve FOTO score by 8-10 points  2  Improve R shoulder ROM to WFL's  3  Improve R shoulder strength by 1/2 grade  4  Pt will have less pain with dressing activities  5  Pt will be able to buckle a seat belt with less pain  6   Pt will be able to reach overhead with less pain    Plan  Patient would benefit from: PT eval and skilled physical therapy  Planned modality interventions: TENS, ultrasound and cryotherapy  Planned therapy interventions: manual therapy, joint mobilization, stretching, strengthening, therapeutic activities, therapeutic exercise, functional ROM exercises, home exercise program and neuromuscular re-education  Frequency: 2x week  Duration in weeks: 8  Plan of Care beginning date: 2019  Plan of Care expiration date: 4/10/2019  Treatment plan discussed with: patient        Subjective Evaluation    History of Present Illness  Mechanism of injury: 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  Pt thinks she injured her R shoulder using the sledge hammer when pounding posts into the ground to make a fence  Pt has been having chiropractic treatment on her R shoulder and has also had an injection from ortho physician with some relief  Pt reports increased pain when sleeping on her R shoulder, rolling in bed, and pulling sx when sitting in a chair  Pt has increased pain when carrying a laundry basket, vacuuming, performing household cleaning activities, putting on a seat belt, putting on a bra, getting dressed,  and when reaching overhead      Work: flower business  Hobbies: flowers, watch TV, household projects, doing laundry  Gait: no abnormalities  Pain  At best pain ratin  At worst pain ratin  Location: R shoulder  Relieving factors: medications    Social Support  Stairs in house: yes   6  Lives in: trailer  Lives with: spouse    Hand dominance: right  Life stress: spouse with multiple medical problems- seizures      Diagnostic Tests  X-ray: normal  Treatments  Previous treatment: chiropractic and injection treatment  Patient Goals  Patient goals for therapy: decreased pain  Patient goal: to have full use of her R arm        Objective     Neurological Testing     Sensation     Shoulder   Left Shoulder   Intact: light touch    Right Shoulder   Intact: light touch    Reflexes   Left   Biceps (C5/C6): normal (2+)  Brachioradialis (C6): normal (2+)  Triceps (C7): normal (2+)    Right   Biceps (C5/C6): normal (2+)  Brachioradialis (C6): normal (2+)  Triceps (C7): normal (2+)    Active Range of Motion   Left Shoulder   Flexion: 145 degrees   Abduction: 145 degrees   External rotation 45°: 65 degrees   Internal rotation 45°: 60 degrees     Right Shoulder   Flexion: 40 degrees with pain  Abduction: 55 degrees with pain    Additional Active Range of Motion Details  (+) TTP R biceps tendon insertion into anterior shoulder  Posture: moderate forward head/ rounded shoulders  Cervical ROM: limited all planes with pain with L SB and L rotation  (+) TTP B upper trapezius, levator scap    Passive Range of Motion   Left Shoulder   Flexion: 135 degrees   Abduction: 160 degrees   External rotation 45°: 72 degrees   Internal rotation 45°: 65 degrees     Strength/Myotome Testing     Left Shoulder     Planes of Motion   External rotation at 0°: 4+   Internal rotation at 0°: 4+     Right Shoulder   Normal muscle strength      Flowsheet Rows      Most Recent Value   PT/OT G-Codes   Current Score  50   Projected Score  64        Dx: R shoulder pain/ RTC dysfunction  EPOC: 4/10/19  CO-MORBIDITIES: back pain  PERSONAL FACTORS: spouse with multiple medical issues  Precautions: atrial fib    Daily Treatment Diary     Manual              Gentle R shoulder PROM             R biceps tendon MFR             K tape V down/ biceps inhibition                                           Exercise Diary              Pendulums: CW, CCW             Pulleys: flex             Pulleys: scap             Table slides: flex, scap, ER                          Supine cane flexion             Supine cane ER                          S/l R shoulder ER AROM                                                                                                                                                                Modalities              CUS x8' 1 4 w/cm to R anterior shld -prn

## 2019-02-19 ENCOUNTER — OFFICE VISIT (OUTPATIENT)
Dept: PHYSICAL THERAPY | Facility: CLINIC | Age: 69
End: 2019-02-19
Payer: COMMERCIAL

## 2019-02-19 DIAGNOSIS — M25.511 RIGHT SHOULDER PAIN, UNSPECIFIED CHRONICITY: Primary | ICD-10-CM

## 2019-02-19 DIAGNOSIS — S46.001S INJURY OF RIGHT ROTATOR CUFF, SEQUELA: ICD-10-CM

## 2019-02-19 PROCEDURE — 97035 APP MDLTY 1+ULTRASOUND EA 15: CPT

## 2019-02-19 PROCEDURE — 97140 MANUAL THERAPY 1/> REGIONS: CPT

## 2019-02-19 PROCEDURE — 97110 THERAPEUTIC EXERCISES: CPT

## 2019-02-19 NOTE — PROGRESS NOTES
Daily Note     Today's date: 2019  Patient name: Justin Chambers  : 1950  MRN: [id number]  Referring provider: Ubaldo Marrero  Dx:   Encounter Diagnosis     ICD-10-CM    1  Right shoulder pain, unspecified chronicity M25 511    2  Injury of right rotator cuff, sequela S46 001S                   Subjective: Pt  Stated she is still in a lot of discomfort, no change since IE  Objective: See treatment diary below      Assessment: Tolerated treatment poor  Patient demonstrated fatigue post treatment and would benefit from continued PT  Initiated PT POC today  Pt  Has difficulty with any active or passive movements at this time  She needs frequents VC's to relax as she tightens up and guards with any movement  Pt  Also has difficulty performing the TE's given  Pt  Did experience some relief post US treatment  Plan: Continue per plan of care  Progress treatment as tolerated            Dx: R shoulder pain/ RTC dysfunction  EPOC: 4/10/19  CO-MORBIDITIES: back pain  PERSONAL FACTORS: spouse with multiple medical issues  Precautions: atrial fib    Daily Treatment Diary     Manual              Gentle R shoulder PROM 7'            R biceps tendon MFR 3'            K tape V down/ biceps inhibition nv                                          Exercise Diary              Pendulums: CW, CCW 10 ea            Pulleys: flex 2'            Pulleys: scap 2'            Table slides: flex, scap, ER                          Supine cane flexion 10            Supine cane ER 10                         S/l R shoulder ER AROM                                                                                                                                                                Modalities              CUS x8' 1 4 w/cm to R anterior shld -prn WE  8'

## 2019-02-21 ENCOUNTER — OFFICE VISIT (OUTPATIENT)
Dept: PHYSICAL THERAPY | Facility: CLINIC | Age: 69
End: 2019-02-21
Payer: COMMERCIAL

## 2019-02-21 DIAGNOSIS — M25.511 ACUTE PAIN OF RIGHT SHOULDER: ICD-10-CM

## 2019-02-21 DIAGNOSIS — S46.001S INJURY OF RIGHT ROTATOR CUFF, SEQUELA: ICD-10-CM

## 2019-02-21 DIAGNOSIS — M25.511 RIGHT SHOULDER PAIN, UNSPECIFIED CHRONICITY: Primary | ICD-10-CM

## 2019-02-21 PROCEDURE — 97035 APP MDLTY 1+ULTRASOUND EA 15: CPT

## 2019-02-21 PROCEDURE — 97140 MANUAL THERAPY 1/> REGIONS: CPT

## 2019-02-21 PROCEDURE — 97110 THERAPEUTIC EXERCISES: CPT

## 2019-02-21 NOTE — PROGRESS NOTES
Daily Note     Today's date: 2019  Patient name: Mike Malone  : 1950  MRN: [id number]  Referring provider: Anthony Morris  Dx:   Encounter Diagnosis     ICD-10-CM    1  Right shoulder pain, unspecified chronicity M25 511    2  Injury of right rotator cuff, sequela S46 001S    3  Acute pain of right shoulder M25 511                   Subjective: Pt  Stated she is feeling a little better since last visit  She has not taken any pain meds since last visit and discomfort is just starting to set back in this morning  Objective: See treatment diary below      Assessment: Tolerated treatment poor  Patient demonstrated fatigue post treatment and would benefit from continued PT  Pt  Completed documented program  Pt  Still has difficulty with active or passive movements at this time  She needs frequents VC's to relax as she tightens up and guards with most movements  Pt  Also has discomfort when performing the TE's given but able to complete  Pt  again experience some relief post US treatment  Plan: Continue per plan of care  Progress treatment as tolerated            Dx: R shoulder pain/ RTC dysfunction  EPOC: 4/10/19  CO-MORBIDITIES: back pain  PERSONAL FACTORS: spouse with multiple medical issues  Precautions: atrial fib    Daily Treatment Diary     Manual             Gentle R shoulder PROM 7' 7'           R biceps tendon MFR 3' 3'           K tape V down/ biceps inhibition nv nv                                         Exercise Diary             Pendulums: CW, CCW 10 ea 15           Pulleys: flex 2' 2'           Pulleys: scap 2' 2'           Table slides: flex, scap, ER  10 ea                        Supine cane flexion 10 15           Supine cane ER 10 15                        S/l R shoulder ER AROM                                                                                                                                                                Modalities 2/19 2/21           CUS x8' 1 4 w/cm to R anterior shld -prn WE  8' WE  8'

## 2019-02-26 ENCOUNTER — OFFICE VISIT (OUTPATIENT)
Dept: PHYSICAL THERAPY | Facility: CLINIC | Age: 69
End: 2019-02-26
Payer: COMMERCIAL

## 2019-02-26 DIAGNOSIS — M25.511 RIGHT SHOULDER PAIN, UNSPECIFIED CHRONICITY: Primary | ICD-10-CM

## 2019-02-26 DIAGNOSIS — M25.511 ACUTE PAIN OF RIGHT SHOULDER: ICD-10-CM

## 2019-02-26 DIAGNOSIS — S46.001S INJURY OF RIGHT ROTATOR CUFF, SEQUELA: ICD-10-CM

## 2019-02-26 PROCEDURE — 97035 APP MDLTY 1+ULTRASOUND EA 15: CPT

## 2019-02-26 PROCEDURE — 97140 MANUAL THERAPY 1/> REGIONS: CPT

## 2019-02-26 PROCEDURE — 97110 THERAPEUTIC EXERCISES: CPT

## 2019-02-26 NOTE — PROGRESS NOTES
Daily Note     Today's date: 2019  Patient name: Shena Agudelo  : 1950  MRN: [id number]  Referring provider: Michael Ardon  Dx:   Encounter Diagnosis     ICD-10-CM    1  Right shoulder pain, unspecified chronicity M25 511    2  Injury of right rotator cuff, sequela S46 001S    3  Acute pain of right shoulder M25 511                   Subjective: Pt  Stated she still feels she is getting a little better but was outside yesterday in the wind tying down pieces to her shed which aggrivated her shoulder again       Objective: See treatment diary below      Assessment: Tolerated treatment poor  Patient demonstrated fatigue post treatment and would benefit from continued PT  Pt  Completed documented program  Pt  Still has difficulty with active or passive movements at this time, but PROM has improved  She needs frequents VC's to relax as she tightens up and guards with most movements  Most discomfort with extension while in supine  Pt  again experience some relief post US treatment  Trialed K-tape for bicep and delt inhibition  Plan: Continue per plan of care  Progress treatment as tolerated            Dx: R shoulder pain/ RTC dysfunction  EPOC: 4/10/19  CO-MORBIDITIES: back pain  PERSONAL FACTORS: spouse with multiple medical issues  Precautions: atrial fib    Daily Treatment Diary     Manual            Gentle R shoulder PROM 7' 7' 7'          R biceps tendon MFR 3' 3' 3'          K tape V down/ biceps inhibition nv nv Bicep & Delt  WE                                        Exercise Diary            UBE   2'/2'          Pendulums: CW, CCW 10 ea 15           Pulleys: flex 2' 2' 2'          Pulleys: scap 2' 2' 2'          Table slides: flex, scap, ER  10 ea 10 ea                       Supine cane flexion 10 15 15          Supine cane ER 10 15 15                       S/l R shoulder ER AROM   10 Modalities  2/19 2/21 2/26          CUS x8' 1 4 w/cm to R anterior shld -prn WE  8' WE  8' WE  8'

## 2019-02-28 ENCOUNTER — OFFICE VISIT (OUTPATIENT)
Dept: PHYSICAL THERAPY | Facility: CLINIC | Age: 69
End: 2019-02-28
Payer: COMMERCIAL

## 2019-02-28 DIAGNOSIS — M25.511 ACUTE PAIN OF RIGHT SHOULDER: ICD-10-CM

## 2019-02-28 DIAGNOSIS — M25.511 RIGHT SHOULDER PAIN, UNSPECIFIED CHRONICITY: Primary | ICD-10-CM

## 2019-02-28 DIAGNOSIS — S46.001S INJURY OF RIGHT ROTATOR CUFF, SEQUELA: ICD-10-CM

## 2019-02-28 PROCEDURE — 97110 THERAPEUTIC EXERCISES: CPT

## 2019-02-28 PROCEDURE — 97140 MANUAL THERAPY 1/> REGIONS: CPT

## 2019-02-28 PROCEDURE — 97035 APP MDLTY 1+ULTRASOUND EA 15: CPT

## 2019-02-28 NOTE — PROGRESS NOTES
Daily Note     Today's date: 2019  Patient name: Srikanth Ngo  : 1950  MRN: [id number]  Referring provider: Emmanuel Manuel  Dx:   Encounter Diagnosis     ICD-10-CM    1  Right shoulder pain, unspecified chronicity M25 511    2  Injury of right rotator cuff, sequela S46 001S    3  Acute pain of right shoulder M25 511                   Subjective: Pt  Stated her pain is still very inconsistent  She has good days and bad days  Objective: See treatment diary below      Assessment: Tolerated treatment poor  Patient demonstrated fatigue post treatment and would benefit from continued PT  Pt  Completed documented program  Pt  Still has difficulty with active or passive movements at this time, but PROM has improved  Pt  Still has very low tolerance for any TE's given  Most of her discomfort with extension while in supine  PT TH  Performed R shoulder mobs which seemed to help increase shoulder flexibility but also reported deep ache increased post mobs  Pt  again experience some relief post US treatment  Plan: Continue per plan of care  Progress treatment as tolerated            Dx: R shoulder pain/ RTC dysfunction  EPOC: 4/10/19  CO-MORBIDITIES: back pain  PERSONAL FACTORS: spouse with multiple medical issues  Precautions: atrial fib    Daily Treatment Diary     Manual           Gentle R shoulder PROM 7' 7' 7' 8         R biceps tendon MFR 3' 3' 3' 2         K tape V down/ biceps inhibition nv nv Bicep & Delt  WE Bicep & Delt  WE         R shoulder mobs   [facility]                          Exercise Diary           UBE   2'/2' 2'/2'         Pendulums: CW, CCW 10 ea 15  10 ea         Pulleys: flex 2' 2' 2' 2'         Pulleys: scap 2' 2' 2' 2'         Table slides: flex, scap, ER  10 ea 10 ea 10 ea                      Supine cane flexion 10 15 15 15         Supine cane ER 10 15 15 20                      S/l R shoulder ER AROM   10 10 Modalities  2/19 2/21 2/26 2/28         CUS x8' 1 4 w/cm to R anterior shld -prn WE  8' WE  8' WE  8' WE  8'

## 2019-03-04 ENCOUNTER — APPOINTMENT (OUTPATIENT)
Dept: PHYSICAL THERAPY | Facility: CLINIC | Age: 69
End: 2019-03-04
Payer: COMMERCIAL

## 2019-03-07 ENCOUNTER — OFFICE VISIT (OUTPATIENT)
Dept: PHYSICAL THERAPY | Facility: CLINIC | Age: 69
End: 2019-03-07
Payer: COMMERCIAL

## 2019-03-07 DIAGNOSIS — S46.001S INJURY OF RIGHT ROTATOR CUFF, SEQUELA: ICD-10-CM

## 2019-03-07 DIAGNOSIS — M25.511 ACUTE PAIN OF RIGHT SHOULDER: ICD-10-CM

## 2019-03-07 DIAGNOSIS — M25.511 RIGHT SHOULDER PAIN, UNSPECIFIED CHRONICITY: Primary | ICD-10-CM

## 2019-03-07 PROCEDURE — 97110 THERAPEUTIC EXERCISES: CPT | Performed by: PHYSICAL THERAPIST

## 2019-03-07 NOTE — PROGRESS NOTES
Daily Note     Today's date: 3/7/2019  Patient name: Dudley Esquivel  : 1950  MRN: [id number]  Referring provider: Helga Perry  Dx:   Encounter Diagnosis     ICD-10-CM    1  Right shoulder pain, unspecified chronicity M25 511    2  Injury of right rotator cuff, sequela S46 001S    3  Acute pain of right shoulder M25 511                   Subjective: Pt reports her shoulder always hurts  The pain doesn't go away  Pt notes her pain is relieved with Tylenol  Pt has increased pain when putting on a seat belt and putting her arm back  6/10 upon arrival to therapy, 8/10 post ex, 4-5/10 post US and K tape      Objective: See treatment diary below      Assessment: Tolerated treatment fair  Patient was aggravated by ther ex and relieved by ultrasound and K tape  Plan: Continue per plan of care           Dx: R shoulder pain/ RTC dysfunction  EPOC: 4/10/19  CO-MORBIDITIES: back pain  PERSONAL FACTORS: spouse with multiple medical issues  Precautions: atrial fib    Daily Treatment Diary     Manual   37        Gentle R shoulder PROM 7' 7' 7' 8         R biceps tendon MFR 3' 3' 3' 2         K tape V down/ biceps inhibition nv nv Bicep & Delt  WE Bicep & Delt  WE th        R shoulder mobs   [facility] AT [facility] AT [place]                          Exercise Diary   37        UBE   2'/2' 2'/2' 2'/2'        Pendulums: CW, CCW 10 ea 15  10 ea 10 ea        Pulleys: flex 2' 2' 2' 2' 2'        Pulleys: scap 2' 2' 2' 2' 2'        Table slides: flex, scap, ER  10 ea 10 ea 10 ea 10 ea                     Supine cane flexion 10 15 15 15         Supine cane ER 10 15 15 20 10                     S/l R shoulder ER AROM   10 10                                                                                                                                                            Modalities   37        CUS x8' 1 4 w/cm to R anterior shld -prn WE  8' WE  8' WE  8' WE  8' Th 8'

## 2019-03-11 ENCOUNTER — OFFICE VISIT (OUTPATIENT)
Dept: PHYSICAL THERAPY | Facility: CLINIC | Age: 69
End: 2019-03-11
Payer: COMMERCIAL

## 2019-03-11 DIAGNOSIS — S46.001S INJURY OF RIGHT ROTATOR CUFF, SEQUELA: ICD-10-CM

## 2019-03-11 DIAGNOSIS — M25.511 ACUTE PAIN OF RIGHT SHOULDER: ICD-10-CM

## 2019-03-11 DIAGNOSIS — M25.511 RIGHT SHOULDER PAIN, UNSPECIFIED CHRONICITY: Primary | ICD-10-CM

## 2019-03-11 PROCEDURE — 97140 MANUAL THERAPY 1/> REGIONS: CPT | Performed by: PHYSICAL THERAPIST

## 2019-03-11 PROCEDURE — 97110 THERAPEUTIC EXERCISES: CPT | Performed by: PHYSICAL THERAPIST

## 2019-03-11 PROCEDURE — 97035 APP MDLTY 1+ULTRASOUND EA 15: CPT | Performed by: PHYSICAL THERAPIST

## 2019-03-11 NOTE — PROGRESS NOTES
PT Re-Evaluation     Today's date: 3/11/2019  Patient name: Daksha Tanner  : 1950  MRN: [id number]  Referring provider: Chacho Aguero  Dx:   Encounter Diagnosis     ICD-10-CM    1  Right shoulder pain, unspecified chronicity M25 511    2  Injury of right rotator cuff, sequela S46 001S    3  Acute pain of right shoulder M25 511                   Assessment  Assessment details: Since starting skilled PT, pain levels remain elevated, R shoulder ROM has improved, R shoulder strength has improved with good functional progress  Recommend pt continue for one more visit, then follow up with physician  Please advise regarding continuing skilled PT  Impairments: abnormal or restricted ROM, impaired physical strength and pain with function  Understanding of Dx/Px/POC: good   Prognosis: good    Goals  STG's ( 3-4 weeks)  1  Pt will be independent in HEP-met  2  Improve R shoulder ROM by 10*-15*-met  LTG's ( 6- 8 weeks)  1  Improve FOTO score by 8-10 points-met  2  Improve R shoulder ROM to WFL's-partial met  3  Improve R shoulder strength by 1/2 grade-partial met  4  Pt will have less pain with dressing activities- partial met  5  Pt will be able to buckle a seat belt with less pain- partial met  6  Pt will be able to reach overhead with less pain- partial met    Plan  Patient would benefit from: skilled physical therapy  Planned modality interventions: TENS, ultrasound and cryotherapy  Planned therapy interventions: manual therapy, joint mobilization, stretching, strengthening, therapeutic activities, therapeutic exercise, functional ROM exercises, home exercise program and neuromuscular re-education  Frequency: 2x week  Duration in weeks: 4  Plan of Care beginning date: 3/11/2019  Plan of Care expiration date: 2019  Treatment plan discussed with: patient        Subjective Evaluation    History of Present Illness  Mechanism of injury: Pt reports a 25% improvement since starting skilled PT   Pt reports a slight improvement when putting on a seat belt and when getting dressed  Pt notes slightly less pain when sleeping on her side and when reaching overhead  Pt is compliant in HEP       Work: flower business  Hobbies: flowers, watch TV, household projects, doing laundry  Gait: no abnormalities  Pain  At best pain ratin  At worst pain ratin  Location: R shoulder  Relieving factors: medications    Social Support  Stairs in house: yes   6  Lives in: trailer  Lives with: spouse    Hand dominance: right  Life stress: spouse with multiple medical problems- seizures      Diagnostic Tests  X-ray: normal  Treatments  Previous treatment: chiropractic and injection treatment  Patient Goals  Patient goals for therapy: decreased pain  Patient goal: to have full use of her R arm        Objective     Neurological Testing     Sensation     Shoulder   Left Shoulder   Intact: light touch    Right Shoulder   Intact: light touch    Reflexes   Left   Biceps (C5/C6): normal (2+)  Brachioradialis (C6): normal (2+)  Triceps (C7): normal (2+)    Right   Biceps (C5/C6): normal (2+)  Brachioradialis (C6): normal (2+)  Triceps (C7): normal (2+)    Active Range of Motion   Left Shoulder   Flexion: 145 degrees   Abduction: 145 degrees   External rotation 45°: 65 degrees   Internal rotation 45°: 60 degrees     Right Shoulder   Flexion: 115 degrees with pain  Abduction: 120 degrees with pain  External rotation 45°: 85 degrees with pain  Internal rotation 45°: 75 degrees with pain    Additional Active Range of Motion Details  (+) TTP R biceps tendon insertion into anterior shoulder  Posture: moderate forward head/ rounded shoulders  Cervical ROM: limited all planes with pain with L SB and L rotation  (+) TTP B upper trapezius, levator scap    Passive Range of Motion   Left Shoulder   Flexion: 155 degrees   Abduction: WFL  External rotation 45°: [facility]  Internal rotation 45°: WFL    Right Shoulder   Flexion: 140 degrees with pain  Abduction: [facility] and with pain  External rotation 45°: 85 degrees with pain  Internal rotation 45°: 75 degrees with pain    Strength/Myotome Testing     Left Shoulder   Normal muscle strength    Right Shoulder     Planes of Motion   Flexion: 4 (pain)   Abduction: 4+   External rotation at 0°: 5   Internal rotation at 0°: 5          Dx: R shoulder pain/ RTC dysfunction  EPOC: 4/10/19  CO-MORBIDITIES: back pain  PERSONAL FACTORS: spouse with multiple medical issues  Precautions: atrial fib    Daily Treatment Diary     Manual  2/19 2/21 2/26 2/28 3/7 3/11       Gentle R shoulder PROM 7' 7' 7' 8  th       R biceps tendon MFR 3' 3' 3' 2         K tape V down/ biceps inhibition nv nv Bicep & Delt  WE Bicep & Delt  WE th th       R shoulder mobs   [facility] AT [place] TH  th                        Exercise Diary  2/19 2/21 2/26 2/28 3/7 3/11       UBE   2'/2' 2'/2' 2'/2' 2'/2'       Pendulums: CW, CCW 10 ea 15  10 ea 10 ea 10 ea       Pulleys: flex 2' 2' 2' 2' 2' 2'       Pulleys: scap 2' 2' 2' 2' 2' 2'       Table slides: flex, scap, ER  10 ea 10 ea 10 ea 10 ea                     Supine cane flexion 10 15 15 15  10       Supine cane ER 10 15 15 20 10 10                    S/l R shoulder ER AROM   10 10                                                                                                                                                            Modalities  2/19 2/21 2/26 2/28 3/7 3/11       CUS x8' 1 4 w/cm to R anterior shld -prn WE  8' WE  8' WE  8' WE  8' Th 8' Th 8'

## 2019-03-13 ENCOUNTER — OFFICE VISIT (OUTPATIENT)
Dept: PHYSICAL THERAPY | Facility: CLINIC | Age: 69
End: 2019-03-13
Payer: COMMERCIAL

## 2019-03-13 DIAGNOSIS — M25.511 RIGHT SHOULDER PAIN, UNSPECIFIED CHRONICITY: Primary | ICD-10-CM

## 2019-03-13 DIAGNOSIS — S46.001S INJURY OF RIGHT ROTATOR CUFF, SEQUELA: ICD-10-CM

## 2019-03-13 DIAGNOSIS — M25.511 ACUTE PAIN OF RIGHT SHOULDER: ICD-10-CM

## 2019-03-13 PROCEDURE — 97035 APP MDLTY 1+ULTRASOUND EA 15: CPT | Performed by: PHYSICAL THERAPIST

## 2019-03-13 PROCEDURE — 97110 THERAPEUTIC EXERCISES: CPT | Performed by: PHYSICAL THERAPIST

## 2019-03-13 NOTE — PROGRESS NOTES
Daily Note   3/28/19: Will d/c pt from skilled PT    Today's date: 3/13/2019  Patient name: Karmen Barr  : 1950  MRN: [id number]  Referring provider: Babatunde Marks  Dx:   Encounter Diagnosis     ICD-10-CM    1  Right shoulder pain, unspecified chronicity M25 511    2  Injury of right rotator cuff, sequela S46 001S    3  Acute pain of right shoulder M25 511                   Subjective: Pt reports her shoulder is really hurting today  6-7/10 pain levels      Objective: See treatment diary below      Assessment: Tolerated treatment fair  Patient was able to perform ther ex  Pt did not want manual therapy performed  Relief  post US      Plan: Hold on PT at this time  Pt will follow up with physician   Please advise regarding therapy        Dx: R shoulder pain/ RTC dysfunction  EPOC: 4/10/19  CO-MORBIDITIES: back pain  PERSONAL FACTORS: spouse with multiple medical issues  Precautions: atrial fib    Daily Treatment Diary     Manual  2/19 2/21 2/26 2/28 3/7 3/11 3/13      Gentle R shoulder PROM 7' 7' 7' 8  th       R biceps tendon MFR 3' 3' 3' 2         K tape V down/ biceps inhibition nv nv Bicep & Delt  WE Bicep & Delt  WE th th th      R shoulder mobs   [facility] AT [place] TH  th                        Exercise Diary  2/19 2/21 2/26 2/28 3/7 3/11 3/13      UBE   2'/2' 2'/2' 2'/2' 2'/2' 2'/2'      Pendulums: CW, CCW 10 ea 15  10 ea 10 ea 10 ea 10 ea      Pulleys: flex 2' 2' 2' 2' 2' 2' 3'      Pulleys: scap 2' 2' 2' 2' 2' 2' 3'      Table slides: flex, scap, ER  10 ea 10 ea 10 ea 10 ea  10 ea                   Supine cane flexion 10 15 15 15  10       Supine cane ER 10 15 15 20 10 10 10                   S/l R shoulder ER AROM   10 10                                                                                                                                                            Modalities  2/19 2/21 2/26 2/28 3/7 3/11 3/13      CUS x8' 1 4 w/cm to R anterior shld -prn WE  8' WE  8' WE  8' WE  8' Th 8' Th 8' 8'

## 2019-03-15 DIAGNOSIS — E03.9 HYPOTHYROIDISM, UNSPECIFIED TYPE: ICD-10-CM

## 2019-03-15 RX ORDER — LEVOTHYROXINE SODIUM 0.03 MG/1
TABLET ORAL
Qty: 90 TABLET | Refills: 2 | Status: SHIPPED | OUTPATIENT
Start: 2019-03-15 | End: 2019-05-14 | Stop reason: DRUGHIGH

## 2019-03-27 ENCOUNTER — OFFICE VISIT (OUTPATIENT)
Dept: OBGYN CLINIC | Facility: CLINIC | Age: 69
End: 2019-03-27
Payer: COMMERCIAL

## 2019-03-27 VITALS
SYSTOLIC BLOOD PRESSURE: 108 MMHG | BODY MASS INDEX: 35.49 KG/M2 | HEART RATE: 69 BPM | HEIGHT: 65 IN | WEIGHT: 213 LBS | DIASTOLIC BLOOD PRESSURE: 74 MMHG

## 2019-03-27 DIAGNOSIS — M75.101 TEAR OF RIGHT ROTATOR CUFF, UNSPECIFIED TEAR EXTENT: Primary | ICD-10-CM

## 2019-03-27 PROCEDURE — 99213 OFFICE O/P EST LOW 20 MIN: CPT | Performed by: ORTHOPAEDIC SURGERY

## 2019-03-27 NOTE — PROGRESS NOTES
Ortho Sports Medicine Shoulder Follow Up Visit     Assesment:   79year old female with Right possible rotator cuff tear    Plan:    Conservative treatment:    Ice to shoulder 1-2 times daily, for 20 minutes at a time  PT for ROM and strengthening to shoulder, rotator cuff, scapular stabilizers  Home exercise program for shoulder, including ROM and strenthening  Instructions given to patient of what exercises to perform  Imaging:    MRI was order to rule out possible rotator cuff tear  Injection:    No Injection planned at this time  Surgery:     No surgery is recommended at this point, continue with conservative treatment plan as noted  Follow up:    Return for follow-up after MRI to discuss results  Chief Complaint   Patient presents with    Right Shoulder - Follow-up         History of Present Illness: The patient is returns for follow up of Right shoulder pain  Since the prior visit, She reports minimal improvement  She stated that she has been going to physical therapy, attending twice a week for the last four weeks  She stated that she believes she is stronger in the shoulder, but the problem she has is still present  She stated that she can't reach back and it causes her pain  She also gets worried when she goes to reach up because her arms gets unsteady  Pain is improved by rest, ice, NSAIDS and physical therapy  Pain is aggravated by overhead activity, reaching back, lifting  and exercising  Symptoms include clicking and catching  The patient has weakness  The patient has tried rest, ice, NSAIDS and physical therapy  I have reviewed the past medical, surgical, social and family history, medications and allergies as documented in the EMR  Review of systems: ROS is negative other than that noted in the HPI  Constitutional: Negative for fatigue and fever        Physical Exam:    Blood pressure 108/74, pulse 69, height 5' 5" (1 651 m), weight 96 6 kg (213 lb)      General/Constitutional: NAD, well developed, well nourished  HENT: Normocephalic, atraumatic  CV: Intact distal pulses, regular rate  Resp: No respiratory distress or labored breathing  Lymphatic: No lymphadenopathy palpated  Neuro: Alert and Oriented x 3, no focal deficits  Psych: Normal mood, normal affect, normal judgement, normal behavior  Skin: Warm, dry, no rashes, no erythema      Shoulder focused exam:       RIGHT LEFT    Scapula Atrophy Negative Negative     Winging Negative Negative     Protraction Negative Negative    Rotator cuff SS 4/5 5/5     IS 4/5 5/5     SubS 5/5 5/5    ROM     160     ER0 60 60     ER90 90    90     IR90 L5    L5     IRb L5    L5    TTP: AC Negative Negative     Biceps Negative Negative     Coracoid Negative Negative    Special Tests: O'Briens Negative Negative     Lee-shear Negative Negative     Cross body Adduction Negative Negative     Speeds  Negative Negative     Marie's Negative Negative     Whipple Positive Negative       Neer Negative Negative     Morales Negative Negative    Instability: Apprehension & relocation not tested not tested     Load & shift not tested not tested    Other: Crank Negative Negative               UE NV Exam: +2 Radial pulses bilaterally  Sensation intact to light touch C5-T1 bilaterally, Radial/median/ulnar nerve motor intact    Cervical ROM is full without pain, numbness or tingling      Shoulder Imaging    No imaging was performed today

## 2019-03-27 NOTE — LETTER
March 28, 2019     [address] [facility] [address]    Patient: Que Nair   YOB: 1950   Date of Visit: 3/27/2019       Dear Dr Roselia Delgado: Thank you for referring Ivanna Schaefer to me for evaluation  Below are my notes for this consultation  If you have questions, please do not hesitate to call me  I look forward to following your patient along with you  Sincerely,        Barbara Gallardo DO        CC: No Recipients  Barbara Gallardo DO  3/28/2019  5:37 PM  Sign at close encounter  Ortho Sports Medicine Shoulder Follow Up Visit     Assesment:   79year old female with Right possible rotator cuff tear    Plan:    Conservative treatment:    Ice to shoulder 1-2 times daily, for 20 minutes at a time  PT for ROM and strengthening to shoulder, rotator cuff, scapular stabilizers  Home exercise program for shoulder, including ROM and strenthening  Instructions given to patient of what exercises to perform  Imaging:    MRI was order to rule out possible rotator cuff tear  Injection:    No Injection planned at this time  Surgery:     No surgery is recommended at this point, continue with conservative treatment plan as noted  Follow up:    Return for follow-up after MRI to discuss results  Chief Complaint   Patient presents with    Right Shoulder - Follow-up         History of Present Illness: The patient is returns for follow up of Right shoulder pain  Since the prior visit, She reports minimal improvement  She stated that she has been going to physical therapy, attending twice a week for the last four weeks  She stated that she believes she is stronger in the shoulder, but the problem she has is still present  She stated that she can't reach back and it causes her pain  She also gets worried when she goes to reach up because her arms gets unsteady  Pain is improved by rest, ice, NSAIDS and physical therapy    Pain is aggravated by overhead activity, reaching back, lifting  and exercising  Symptoms include clicking and catching  The patient has weakness  The patient has tried rest, ice, NSAIDS and physical therapy  I have reviewed the past medical, surgical, social and family history, medications and allergies as documented in the EMR  Review of systems: ROS is negative other than that noted in the HPI  Constitutional: Negative for fatigue and fever  Physical Exam:    Blood pressure 108/74, pulse 69, height 5' 5" (1 651 m), weight 96 6 kg (213 lb)      General/Constitutional: NAD, well developed, well nourished  HENT: Normocephalic, atraumatic  CV: Intact distal pulses, regular rate  Resp: No respiratory distress or labored breathing  Lymphatic: No lymphadenopathy palpated  Neuro: Alert and Oriented x 3, no focal deficits  Psych: Normal mood, normal affect, normal judgement, normal behavior  Skin: Warm, dry, no rashes, no erythema      Shoulder focused exam:       RIGHT LEFT    Scapula Atrophy Negative Negative     Winging Negative Negative     Protraction Negative Negative    Rotator cuff SS 4/5 5/5     IS 4/5 5/5     SubS 5/5 5/5    ROM     160     ER0 60 60     ER90 90    90     IR90 L5    L5     IRb L5    L5    TTP: AC Negative Negative     Biceps Negative Negative     Coracoid Negative Negative    Special Tests: O'Briens Negative Negative     Lee-shear Negative Negative     Cross body Adduction Negative Negative     Speeds  Negative Negative     Marie's Negative Negative     Whipple Positive Negative       Neer Negative Negative     Morales Negative Negative    Instability: Apprehension & relocation not tested not tested     Load & shift not tested not tested    Other: Crank Negative Negative               UE NV Exam: +2 Radial pulses bilaterally  Sensation intact to light touch C5-T1 bilaterally, Radial/median/ulnar nerve motor intact    Cervical ROM is full without pain, numbness or tingling      Shoulder Imaging    No imaging was performed today

## 2019-04-04 ENCOUNTER — HOSPITAL ENCOUNTER (OUTPATIENT)
Dept: MRI IMAGING | Facility: HOSPITAL | Age: 69
Discharge: HOME | End: 2019-04-04
Payer: COMMERCIAL

## 2019-04-04 DIAGNOSIS — M75.101 TEAR OF RIGHT ROTATOR CUFF: ICD-10-CM

## 2019-04-04 PROCEDURE — 73221 MRI JOINT UPR EXTREM W/O DYE: CPT

## 2019-04-26 ENCOUNTER — OFFICE VISIT (OUTPATIENT)
Dept: OBGYN CLINIC | Facility: CLINIC | Age: 69
End: 2019-04-26
Payer: COMMERCIAL

## 2019-04-26 VITALS
BODY MASS INDEX: 35.49 KG/M2 | HEART RATE: 61 BPM | SYSTOLIC BLOOD PRESSURE: 113 MMHG | DIASTOLIC BLOOD PRESSURE: 74 MMHG | HEIGHT: 65 IN | WEIGHT: 213 LBS

## 2019-04-26 DIAGNOSIS — M75.51 SUBACROMIAL BURSITIS OF RIGHT SHOULDER JOINT: Primary | ICD-10-CM

## 2019-04-26 DIAGNOSIS — M67.911 TENDINOPATHY OF ROTATOR CUFF, RIGHT: ICD-10-CM

## 2019-04-26 PROCEDURE — 99214 OFFICE O/P EST MOD 30 MIN: CPT | Performed by: ORTHOPAEDIC SURGERY

## 2019-05-14 ENCOUNTER — OFFICE VISIT (OUTPATIENT)
Dept: FAMILY MEDICINE CLINIC | Facility: CLINIC | Age: 69
End: 2019-05-14
Payer: COMMERCIAL

## 2019-05-14 VITALS
HEIGHT: 65 IN | HEART RATE: 57 BPM | TEMPERATURE: 97.3 F | OXYGEN SATURATION: 98 % | BODY MASS INDEX: 36.19 KG/M2 | SYSTOLIC BLOOD PRESSURE: 124 MMHG | WEIGHT: 217.25 LBS | DIASTOLIC BLOOD PRESSURE: 76 MMHG

## 2019-05-14 DIAGNOSIS — E66.9 OBESITY (BMI 35.0-39.9 WITHOUT COMORBIDITY): ICD-10-CM

## 2019-05-14 DIAGNOSIS — R21 RASH: Primary | ICD-10-CM

## 2019-05-14 DIAGNOSIS — E03.9 ACQUIRED HYPOTHYROIDISM: ICD-10-CM

## 2019-05-14 PROCEDURE — 99214 OFFICE O/P EST MOD 30 MIN: CPT | Performed by: FAMILY MEDICINE

## 2019-05-14 RX ORDER — LEVOTHYROXINE SODIUM 0.05 MG/1
50 TABLET ORAL DAILY
Qty: 30 TABLET | Refills: 1 | Status: SHIPPED | OUTPATIENT
Start: 2019-05-14 | End: 2019-07-06 | Stop reason: SDUPTHER

## 2019-06-14 ENCOUNTER — OFFICE VISIT (OUTPATIENT)
Dept: OBGYN CLINIC | Facility: CLINIC | Age: 69
End: 2019-06-14
Payer: COMMERCIAL

## 2019-06-14 VITALS
DIASTOLIC BLOOD PRESSURE: 76 MMHG | BODY MASS INDEX: 35.49 KG/M2 | WEIGHT: 213 LBS | HEART RATE: 64 BPM | SYSTOLIC BLOOD PRESSURE: 118 MMHG | HEIGHT: 65 IN

## 2019-06-14 DIAGNOSIS — M25.511 CHRONIC RIGHT SHOULDER PAIN: ICD-10-CM

## 2019-06-14 DIAGNOSIS — G89.29 CHRONIC RIGHT SHOULDER PAIN: ICD-10-CM

## 2019-06-14 DIAGNOSIS — M75.51 SUBACROMIAL BURSITIS OF RIGHT SHOULDER JOINT: Primary | ICD-10-CM

## 2019-06-14 DIAGNOSIS — M67.911 TENDINOPATHY OF ROTATOR CUFF, RIGHT: ICD-10-CM

## 2019-06-14 PROCEDURE — 99213 OFFICE O/P EST LOW 20 MIN: CPT | Performed by: ORTHOPAEDIC SURGERY

## 2019-06-20 ENCOUNTER — CLINICAL SUPPORT (OUTPATIENT)
Dept: FAMILY MEDICINE CLINIC | Facility: CLINIC | Age: 69
End: 2019-06-20
Payer: COMMERCIAL

## 2019-06-20 DIAGNOSIS — E03.9 ACQUIRED HYPOTHYROIDISM: Primary | ICD-10-CM

## 2019-06-20 PROCEDURE — 36415 COLL VENOUS BLD VENIPUNCTURE: CPT

## 2019-06-21 LAB
T3FREE SERPL-MCNC: 2.4 PG/ML (ref 2.3–4.2)
T4 FREE SERPL-MCNC: 1.1 NG/DL (ref 0.8–1.8)
TSH SERPL-ACNC: 1.35 MIU/L (ref 0.4–4.5)

## 2019-06-25 ENCOUNTER — OFFICE VISIT (OUTPATIENT)
Dept: FAMILY MEDICINE CLINIC | Facility: CLINIC | Age: 69
End: 2019-06-25
Payer: COMMERCIAL

## 2019-06-25 VITALS
SYSTOLIC BLOOD PRESSURE: 104 MMHG | DIASTOLIC BLOOD PRESSURE: 72 MMHG | TEMPERATURE: 97.8 F | BODY MASS INDEX: 35.74 KG/M2 | HEART RATE: 57 BPM | WEIGHT: 214.5 LBS | HEIGHT: 65 IN | OXYGEN SATURATION: 97 %

## 2019-06-25 DIAGNOSIS — E66.09 CLASS 2 OBESITY DUE TO EXCESS CALORIES WITHOUT SERIOUS COMORBIDITY WITH BODY MASS INDEX (BMI) OF 35.0 TO 35.9 IN ADULT: ICD-10-CM

## 2019-06-25 DIAGNOSIS — R21 RASH: Primary | ICD-10-CM

## 2019-06-25 PROCEDURE — 1036F TOBACCO NON-USER: CPT | Performed by: FAMILY MEDICINE

## 2019-06-25 PROCEDURE — 3008F BODY MASS INDEX DOCD: CPT | Performed by: FAMILY MEDICINE

## 2019-06-25 PROCEDURE — 99213 OFFICE O/P EST LOW 20 MIN: CPT | Performed by: FAMILY MEDICINE

## 2019-06-25 PROCEDURE — 1160F RVW MEDS BY RX/DR IN RCRD: CPT | Performed by: FAMILY MEDICINE

## 2019-06-26 PROBLEM — I80.00 PHLEBITIS OF SUPERFICIAL VEIN OF LOWER EXTREMITY: Status: RESOLVED | Noted: 2017-08-02 | Resolved: 2019-06-26

## 2019-07-06 DIAGNOSIS — E03.9 ACQUIRED HYPOTHYROIDISM: ICD-10-CM

## 2019-07-08 RX ORDER — LEVOTHYROXINE SODIUM 0.05 MG/1
TABLET ORAL
Qty: 90 TABLET | Refills: 2 | Status: SHIPPED | OUTPATIENT
Start: 2019-07-08 | End: 2020-04-02

## 2020-04-02 DIAGNOSIS — E03.9 ACQUIRED HYPOTHYROIDISM: ICD-10-CM

## 2020-04-02 RX ORDER — LEVOTHYROXINE SODIUM 0.05 MG/1
TABLET ORAL
Qty: 90 TABLET | Refills: 2 | Status: SHIPPED | OUTPATIENT
Start: 2020-04-02 | End: 2020-12-15

## 2020-04-09 ENCOUNTER — TELEPHONE (OUTPATIENT)
Dept: FAMILY MEDICINE CLINIC | Facility: CLINIC | Age: 70
End: 2020-04-09

## 2020-08-18 ENCOUNTER — OFFICE VISIT (OUTPATIENT)
Dept: FAMILY MEDICINE CLINIC | Facility: CLINIC | Age: 70
End: 2020-08-18
Payer: COMMERCIAL

## 2020-08-18 VITALS
SYSTOLIC BLOOD PRESSURE: 128 MMHG | WEIGHT: 226.5 LBS | HEART RATE: 66 BPM | OXYGEN SATURATION: 97 % | DIASTOLIC BLOOD PRESSURE: 72 MMHG | HEIGHT: 65 IN | BODY MASS INDEX: 37.74 KG/M2 | TEMPERATURE: 98.9 F

## 2020-08-18 DIAGNOSIS — E03.9 ACQUIRED HYPOTHYROIDISM: ICD-10-CM

## 2020-08-18 DIAGNOSIS — N30.00 ACUTE CYSTITIS WITHOUT HEMATURIA: Primary | ICD-10-CM

## 2020-08-18 DIAGNOSIS — I48.0 PAROXYSMAL ATRIAL FIBRILLATION (HCC): ICD-10-CM

## 2020-08-18 LAB
SL AMB  POCT GLUCOSE, UA: ABNORMAL
SL AMB LEUKOCYTE ESTERASE,UA: ABNORMAL
SL AMB POCT BILIRUBIN,UA: ABNORMAL
SL AMB POCT BLOOD,UA: ABNORMAL
SL AMB POCT CLARITY,UA: CLEAR
SL AMB POCT COLOR,UA: CLEAR
SL AMB POCT KETONES,UA: ABNORMAL
SL AMB POCT NITRITE,UA: ABNORMAL
SL AMB POCT PH,UA: 5
SL AMB POCT SPECIFIC GRAVITY,UA: 1
SL AMB POCT URINE PROTEIN: ABNORMAL
SL AMB POCT UROBILINOGEN: 0.2

## 2020-08-18 PROCEDURE — 81002 URINALYSIS NONAUTO W/O SCOPE: CPT | Performed by: FAMILY MEDICINE

## 2020-08-18 PROCEDURE — 1101F PT FALLS ASSESS-DOCD LE1/YR: CPT | Performed by: FAMILY MEDICINE

## 2020-08-18 PROCEDURE — 99213 OFFICE O/P EST LOW 20 MIN: CPT | Performed by: FAMILY MEDICINE

## 2020-08-18 PROCEDURE — 3008F BODY MASS INDEX DOCD: CPT | Performed by: FAMILY MEDICINE

## 2020-08-18 PROCEDURE — 1036F TOBACCO NON-USER: CPT | Performed by: FAMILY MEDICINE

## 2020-08-18 PROCEDURE — 1160F RVW MEDS BY RX/DR IN RCRD: CPT | Performed by: FAMILY MEDICINE

## 2020-08-18 PROCEDURE — 3288F FALL RISK ASSESSMENT DOCD: CPT | Performed by: FAMILY MEDICINE

## 2020-08-18 RX ORDER — SULFAMETHOXAZOLE AND TRIMETHOPRIM 800; 160 MG/1; MG/1
1 TABLET ORAL EVERY 12 HOURS SCHEDULED
Qty: 14 TABLET | Refills: 0 | Status: SHIPPED | OUTPATIENT
Start: 2020-08-18 | End: 2020-08-25

## 2020-08-18 NOTE — PROGRESS NOTES
Assessment/Plan:      Diagnoses and all orders for this visit:    Acute cystitis without hematuria  Comments:  Start Bactrim 1 tablet twice a day  Urine sent for culture  Orders:  -     POCT urine dip  -     sulfamethoxazole-trimethoprim (BACTRIM DS) 800-160 mg per tablet; Take 1 tablet by mouth every 12 (twelve) hours for 7 days  -     Urine culture    Acquired hypothyroidism  Comments:  Patient due for TSH  Has additional blood work for cardiology  Continue levothyroxine 50 mcg  Orders:  -     TSH, 3rd generation with Free T4 reflex; Future    Paroxysmal atrial fibrillation (HCC)  Comments:  regular rhythm in office today          Subjective:  Chief Complaint   Patient presents with    Urinary Tract Infection     PT STARTED LAST WEEK WITH URINARY FREQUENCY  PT FEELS SHE IS AT THE START OF A UTI  RECENT GYN VISIT WITH DR ALMAGUER IN [address] [place] [address]  PT DUE FOR LIPID AND TSH  Patient ID: Janine Glover is a 81 y o  female  Pt here in office today  Started with urinary frequency about 10 days ago  No burning with urination  No fever  Complains of urinary frequency and urgency  She has been trying to increase fluids  She has been following COVID-19 precautions  She denies any recent illnesses  She denies any blood in her urine  Review of Systems   Constitutional: Negative  Negative for fatigue and fever  HENT: Negative  Eyes: Negative  Respiratory: Negative  Negative for cough  Cardiovascular: Negative  Gastrointestinal: Negative  Endocrine: Negative  Genitourinary: Positive for frequency and urgency  Negative for dysuria, flank pain, hematuria and pelvic pain  Musculoskeletal: Negative  Skin: Negative  Allergic/Immunologic: Negative  Neurological: Negative  Psychiatric/Behavioral: Negative            The following portions of the patient's history were reviewed and updated as appropriate: allergies, current medications, past family history, past medical history, past social history, past surgical history and problem list     Objective:  Vitals:    08/18/20 1456   BP: 128/72   Pulse: 66   Temp: 98 9 °F (37 2 °C)   SpO2: 97%   Weight: 103 kg (226 lb 8 oz)   Height: 5' 5" (1 651 m)      Physical Exam  Vitals signs and nursing note reviewed  Constitutional:       Appearance: She is well-developed  HENT:      Head: Normocephalic and atraumatic  Cardiovascular:      Rate and Rhythm: Normal rate and regular rhythm  Heart sounds: Normal heart sounds  Pulmonary:      Effort: Pulmonary effort is normal       Breath sounds: Normal breath sounds  Abdominal:      General: Bowel sounds are normal       Palpations: Abdomen is soft  Tenderness: There is no abdominal tenderness  Skin:     General: Skin is warm and dry  Neurological:      Mental Status: She is alert and oriented to person, place, and time  Psychiatric:         Behavior: Behavior normal          Thought Content: Thought content normal          Judgment: Judgment normal        BMI Counseling: Body mass index is 37 69 kg/m²  The BMI is above normal  Nutrition recommendations include reducing portion sizes  Exercise recommendations include exercising 3-5 times per week

## 2020-08-18 NOTE — PATIENT INSTRUCTIONS
Sulfamethoxazole/ trimethoprim 1 tab twice a day   Colonoscopy in September  Follow up with cardiology   Schedule mammogram  Due for medicare wellness to review everything       Urinary Tract Infection in Women   AMBULATORY CARE:   A urinary tract infection (UTI)  is caused by bacteria that get inside your urinary tract  Most bacteria that enter your urinary tract come out when you urinate  If the bacteria stay in your urinary tract, you may get an infection  Your urinary tract includes your kidneys, ureters, bladder, and urethra  Urine is made in your kidneys, and it flows from the ureters to the bladder  Urine leaves the bladder through the urethra  A UTI is more common in your lower urinary tract, which includes your bladder and urethra  Common symptoms include the following:   · Urinating more often or waking from sleep to urinate    · Pain or burning when you urinate    · Pain or pressure in your lower abdomen     · Urine that smells bad    · Blood in your urine    · Leaking urine  Seek care immediately if:   · You are urinating very little or not at all  · You have a high fever with shaking chills  · You have side or back pain that gets worse  Contact your healthcare provider if:   · You have a fever  · You do not feel better after 2 days of taking antibiotics  · You are vomiting  · You have questions or concerns about your condition or care  Treatment for a UTI  may include medicines to treat a bacterial infection  You may also need medicines to decrease pain and burning, or decrease the urge to urinate often  Prevent a UTI:   · Empty your bladder often  Urinate and empty your bladder as soon as you feel the need  Do not hold your urine for long periods of time  · Wipe from front to back after you urinate or have a bowel movement  This will help prevent germs from getting into your urinary tract through your urethra  · Drink liquids as directed    Ask how much liquid to drink each day and which liquids are best for you  You may need to drink more liquids than usual to help flush out the bacteria  Do not drink alcohol, caffeine, or citrus juices  These can irritate your bladder and increase your symptoms  Your healthcare provider may recommend cranberry juice to help prevent a UTI  · Urinate after you have sex  This can help flush out bacteria passed during sex  · Do not douche or use feminine deodorants  These can change the chemical balance in your vagina  · Change sanitary pads or tampons often  This will help prevent germs from getting into your urinary tract  · Do pelvic muscle exercises often  Pelvic muscle exercises may help you start and stop urinating  Strong pelvic muscles may help you empty your bladder easier  Squeeze these muscles tightly for 5 seconds like you are trying to hold back urine  Then relax for 5 seconds  Gradually work up to squeezing for 10 seconds  Do 3 sets of 15 repetitions a day, or as directed  Follow up with your healthcare provider as directed:  Write down your questions so you remember to ask them during your visits  © 2017 [employer] Information is for End User's use only and may not be sold, redistributed or otherwise used for commercial purposes  All illustrations and images included in CareNotes® are the copyrighted property of A D A M [employer] Inc  or Michele Edouard  The above information is an  only  It is not intended as medical advice for individual conditions or treatments  Talk to your doctor, nurse or pharmacist before following any medical regimen to see if it is safe and effective for you

## 2020-12-15 DIAGNOSIS — E03.9 ACQUIRED HYPOTHYROIDISM: ICD-10-CM

## 2020-12-15 RX ORDER — LEVOTHYROXINE SODIUM 0.05 MG/1
TABLET ORAL
Qty: 90 TABLET | Refills: 2 | Status: SHIPPED | OUTPATIENT
Start: 2020-12-15 | End: 2021-09-07

## 2021-01-05 ENCOUNTER — CLINICAL SUPPORT (OUTPATIENT)
Dept: FAMILY MEDICINE CLINIC | Facility: CLINIC | Age: 71
End: 2021-01-05
Payer: COMMERCIAL

## 2021-01-05 DIAGNOSIS — Z23 NEED FOR INFLUENZA VACCINATION: Primary | ICD-10-CM

## 2021-01-05 DIAGNOSIS — Z23 NEED FOR PNEUMOCOCCAL VACCINATION: ICD-10-CM

## 2021-01-05 PROCEDURE — G0009 ADMIN PNEUMOCOCCAL VACCINE: HCPCS

## 2021-01-05 PROCEDURE — 90670 PCV13 VACCINE IM: CPT

## 2021-01-05 PROCEDURE — 90662 IIV NO PRSV INCREASED AG IM: CPT

## 2021-01-05 PROCEDURE — G0008 ADMIN INFLUENZA VIRUS VAC: HCPCS

## 2021-03-27 ENCOUNTER — IMMUNIZATIONS (OUTPATIENT)
Dept: FAMILY MEDICINE CLINIC | Facility: HOSPITAL | Age: 71
End: 2021-03-27

## 2021-03-27 DIAGNOSIS — Z23 ENCOUNTER FOR IMMUNIZATION: Primary | ICD-10-CM

## 2021-03-27 PROCEDURE — 0001A SARS-COV-2 / COVID-19 MRNA VACCINE (PFIZER-BIONTECH) 30 MCG: CPT

## 2021-03-27 PROCEDURE — 91300 SARS-COV-2 / COVID-19 MRNA VACCINE (PFIZER-BIONTECH) 30 MCG: CPT

## 2021-04-17 ENCOUNTER — IMMUNIZATIONS (OUTPATIENT)
Dept: FAMILY MEDICINE CLINIC | Facility: HOSPITAL | Age: 71
End: 2021-04-17

## 2021-04-17 DIAGNOSIS — Z23 ENCOUNTER FOR IMMUNIZATION: Primary | ICD-10-CM

## 2021-04-17 PROCEDURE — 91300 SARS-COV-2 / COVID-19 MRNA VACCINE (PFIZER-BIONTECH) 30 MCG: CPT

## 2021-04-17 PROCEDURE — 0002A SARS-COV-2 / COVID-19 MRNA VACCINE (PFIZER-BIONTECH) 30 MCG: CPT

## 2021-05-26 ENCOUNTER — TELEPHONE (OUTPATIENT)
Dept: FAMILY MEDICINE CLINIC | Facility: CLINIC | Age: 71
End: 2021-05-26

## 2021-05-26 DIAGNOSIS — R07.81 RIB PAIN: Primary | ICD-10-CM

## 2021-05-26 NOTE — TELEPHONE ENCOUNTER
Pt fell a week ago on her right side and is having rib pain, pt's chiro doctor advised her to see if you can put a order in for right posterior rib to rule out fx , pt has pain with lying prone on both anterior-prosterior lateral rib cage compression

## 2021-05-27 ENCOUNTER — TELEPHONE (OUTPATIENT)
Dept: FAMILY MEDICINE CLINIC | Facility: CLINIC | Age: 71
End: 2021-05-27

## 2021-05-27 NOTE — TELEPHONE ENCOUNTER
Patient's son called regarding mom  He states he had called a couple of days ago about a fall she had  You ordered an xray of her ribs  She did not get the xray done yet but today she is extremely nauseas and has a lot of vertigo  She states she did not hit her head but her son is not sure  She is sleeping and does not want to get up  I advised him to take her to the ER as we do not have any appointments  He will wake her up and if he cant get her up will call [phone]

## 2021-05-27 NOTE — TELEPHONE ENCOUNTER
Yes, she needs to be evaluated  Likely has concussion but would get her checked out if she is not improving

## 2021-06-02 ENCOUNTER — RA CDI HCC (OUTPATIENT)
Dept: OTHER | Facility: HOSPITAL | Age: 71
End: 2021-06-02

## 2021-06-02 NOTE — PROGRESS NOTES
[facility]  coding opportunities             Chart reviewed, (number of) suggestions sent to provider: 2               Number of suggestions NOT actually used: 2     Patients insurance company: [address]  (Medicare Advantage and Commercial)     Visit status: Patient arrived for their scheduled appointment     Provider never responded to [facility] 75  coding request     [facility] 75  coding opportunities             Chart reviewed, (number of) suggestions sent to provider: 2           Patients insurance company: [address]  (Medicare Advantage and Commercial)     I48 0 Paroxysmal atrial fibrillation ([facility] )    E66 01 Morbid, severe, obesity due to excess calories ([facility] )  * BMI >35 with comorbid condition     If this is correct, please document and assess at your next visit 6/9/21

## 2021-06-23 ENCOUNTER — OFFICE VISIT (OUTPATIENT)
Dept: FAMILY MEDICINE CLINIC | Facility: CLINIC | Age: 71
End: 2021-06-23
Payer: COMMERCIAL

## 2021-06-23 VITALS
WEIGHT: 225 LBS | HEIGHT: 65 IN | DIASTOLIC BLOOD PRESSURE: 76 MMHG | OXYGEN SATURATION: 99 % | BODY MASS INDEX: 37.49 KG/M2 | HEART RATE: 60 BPM | SYSTOLIC BLOOD PRESSURE: 116 MMHG

## 2021-06-23 DIAGNOSIS — Z12.31 ENCOUNTER FOR SCREENING MAMMOGRAM FOR MALIGNANT NEOPLASM OF BREAST: ICD-10-CM

## 2021-06-23 DIAGNOSIS — R42 VERTIGO: ICD-10-CM

## 2021-06-23 DIAGNOSIS — R91.1 LUNG NODULE < 6CM ON CT: ICD-10-CM

## 2021-06-23 DIAGNOSIS — Z00.00 MEDICARE ANNUAL WELLNESS VISIT, SUBSEQUENT: Primary | ICD-10-CM

## 2021-06-23 PROBLEM — IMO0001 LUNG NODULE < 6CM ON CT: Status: ACTIVE | Noted: 2021-06-23

## 2021-06-23 PROCEDURE — 1101F PT FALLS ASSESS-DOCD LE1/YR: CPT | Performed by: FAMILY MEDICINE

## 2021-06-23 PROCEDURE — 3008F BODY MASS INDEX DOCD: CPT | Performed by: FAMILY MEDICINE

## 2021-06-23 PROCEDURE — 1125F AMNT PAIN NOTED PAIN PRSNT: CPT | Performed by: FAMILY MEDICINE

## 2021-06-23 PROCEDURE — 3725F SCREEN DEPRESSION PERFORMED: CPT | Performed by: FAMILY MEDICINE

## 2021-06-23 PROCEDURE — 1170F FXNL STATUS ASSESSED: CPT | Performed by: FAMILY MEDICINE

## 2021-06-23 PROCEDURE — G0439 PPPS, SUBSEQ VISIT: HCPCS | Performed by: FAMILY MEDICINE

## 2021-06-23 PROCEDURE — 1160F RVW MEDS BY RX/DR IN RCRD: CPT | Performed by: FAMILY MEDICINE

## 2021-06-23 PROCEDURE — 3288F FALL RISK ASSESSMENT DOCD: CPT | Performed by: FAMILY MEDICINE

## 2021-06-23 PROCEDURE — 99214 OFFICE O/P EST MOD 30 MIN: CPT | Performed by: FAMILY MEDICINE

## 2021-06-23 PROCEDURE — 1036F TOBACCO NON-USER: CPT | Performed by: FAMILY MEDICINE

## 2021-06-23 NOTE — PATIENT INSTRUCTIONS
Schedule mammogram  After 9/11   Vertigo   AMBULATORY CARE:   Vertigo  is a condition that causes you to feel dizzy  You may feel that you or everything around you is moving or spinning  You may also feel like you are being pulled down or toward your side  Common symptoms that may happen with vertigo:   · Nausea or vomiting    · Trouble with your balance    · Sensitivity to light or sound    · Weakness, slurred speech, problems seeing or moving, or increased sleepiness    · Facial weakness and headache    · Hearing loss, ear fullness or pain, or hearing ringing sounds    · Fast, uncontrolled movement of your eyes    Seek care immediately if:   · You have a headache and a stiff neck  · You have shaking chills and a fever  · You vomit over and over with no relief  · You have blood, pus, or fluid coming out of your ears  · You are confused  Contact your healthcare provider if:   · Your symptoms do not get better with treatment  · You have questions about your condition or care  Treatment for vertigo  may include resting in bed or avoid certain activities for a time  You may need to decrease or stop taking medicines that are causing your vertigo  Medicines may also be prescribed to help relieve your symptoms  Manage your symptoms:   · Do not drive , walk without help, or operate heavy machinery when you are dizzy  · Move slowly  when you move from one position to another position  Get up slowly from sitting or lying down  Sit or lie down right away if you feel dizzy  · Drink plenty of liquids  Liquids help prevent dehydration  Ask how much liquid to drink each day and which liquids are best for you  · Vestibular and balance rehabilitation therapy (VBRT)  is used to teach you exercises to improve your balance and strength  These exercises may help decrease your vertigo and improve your balance  Ask for more information about this therapy      Follow up with your healthcare provider as directed:  Write down your questions so you remember to ask them during your visits  © Copyright [address] Information is for End User's use only and may not be sold, redistributed or otherwise used for commercial purposes  All illustrations and images included in CareNotes® are the copyrighted property of ALBERTO JAIME Cuipo  or Bina Gomez   The above information is an  only  It is not intended as medical advice for individual conditions or treatments  Talk to your doctor, nurse or pharmacist before following any medical regimen to see if it is safe and effective for you  Medicare Preventive Visit Patient Instructions  Thank you for completing your Welcome to Medicare Visit or Medicare Annual Wellness Visit today  Your next wellness visit will be due in one year (6/24/2022)  The screening/preventive services that you may require over the next 5-10 years are detailed below  Some tests may not apply to you based off risk factors and/or age  Screening tests ordered at today's visit but not completed yet may show as past due  Also, please note that scanned in results may not display below  Preventive Screenings:  Service Recommendations Previous Testing/Comments   Colorectal Cancer Screening  * Colonoscopy    * Fecal Occult Blood Test (FOBT)/Fecal Immunochemical Test (FIT)  * Fecal DNA/Cologuard Test  * Flexible Sigmoidoscopy Age: 54-65 years old   Colonoscopy: every 10 years (may be performed more frequently if at higher risk)  OR  FOBT/FIT: every 1 year  OR  Cologuard: every 3 years  OR  Sigmoidoscopy: every 5 years  Screening may be recommended earlier than age 48 if at higher risk for colorectal cancer  Also, an individualized decision between you and your healthcare provider will decide whether screening between the ages of 74-80 would be appropriate   Colonoscopy: 10/01/2020  FOBT/FIT: 09/14/2018  Cologuard: Not on file  Sigmoidoscopy: Not on file    Screening Current     Breast Cancer Screening Age: 36 years old  Frequency: every 1-2 years  Not required if history of left and right mastectomy Mammogram: 09/11/2020    Screening Current   Cervical Cancer Screening Between the ages of 21-29, pap smear recommended once every 3 years  Between the ages of 33-67, can perform pap smear with HPV co-testing every 5 years  Recommendations may differ for women with a history of total hysterectomy, cervical cancer, or abnormal pap smears in past  Pap Smear: Not on file    Screening Not Indicated   Hepatitis C Screening Once for adults born between 1945 and 1965  More frequently in patients at high risk for Hepatitis C Hep C Antibody: 06/14/2017    Screening Current   Diabetes Screening 1-2 times per year if you're at risk for diabetes or have pre-diabetes Fasting glucose: No results in last 5 years   A1C: No results in last 5 years        Cholesterol Screening Once every 5 years if you don't have a lipid disorder  May order more often based on risk factors  Lipid panel: 06/14/2017    Screening Not Indicated  History Lipid Disorder     Other Preventive Screenings Covered by Medicare:  1  Abdominal Aortic Aneurysm (AAA) Screening: covered once if your at risk  You're considered to be at risk if you have a family history of AAA  2  Lung Cancer Screening: covers low dose CT scan once per year if you meet all of the following conditions: (1) Age 50-69; (2) No signs or symptoms of lung cancer; (3) Current smoker or have quit smoking within the last 15 years; (4) You have a tobacco smoking history of at least 30 pack years (packs per day multiplied by number of years you smoked); (5) You get a written order from a healthcare provider  3  Glaucoma Screening: covered annually if you're considered high risk: (1) You have diabetes OR (2) Family history of glaucoma OR (3)  aged 48 and older OR (3)  American aged 72 and older  3   Osteoporosis Screening: covered every 2 years if you meet one of the following conditions: (1) You're estrogen deficient and at risk for osteoporosis based off medical history and other findings; (2) Have a vertebral abnormality; (3) On glucocorticoid therapy for more than 3 months; (4) Have primary hyperparathyroidism; (5) On osteoporosis medications and need to assess response to drug therapy  · Last bone density test (DXA Scan): 08/13/2018  5  HIV Screening: covered annually if you're between the age of 12-76  Also covered annually if you are younger than 13 and older than 72 with risk factors for HIV infection  For pregnant patients, it is covered up to 3 times per pregnancy  Immunizations:  Immunization Recommendations   Influenza Vaccine Annual influenza vaccination during flu season is recommended for all persons aged >= 6 months who do not have contraindications   Pneumococcal Vaccine (Prevnar and Pneumovax)  * Prevnar = PCV13  * Pneumovax = PPSV23   Adults 25-60 years old: 1-3 doses may be recommended based on certain risk factors  Adults 72 years old: Prevnar (PCV13) vaccine recommended followed by Pneumovax (PPSV23) vaccine  If already received PPSV23 since turning 65, then PCV13 recommended at least one year after PPSV23 dose  Hepatitis B Vaccine 3 dose series if at intermediate or high risk (ex: diabetes, end stage renal disease, liver disease)   Tetanus (Td) Vaccine - COST NOT COVERED BY MEDICARE PART B Following completion of primary series, a booster dose should be given every 10 years to maintain immunity against tetanus  Td may also be given as tetanus wound prophylaxis  Tdap Vaccine - COST NOT COVERED BY MEDICARE PART B Recommended at least once for all adults  For pregnant patients, recommended with each pregnancy     Shingles Vaccine (Shingrix) - COST NOT COVERED BY MEDICARE PART B  2 shot series recommended in those aged 48 and above     Health Maintenance Due:      Topic Date Due    MAMMOGRAM  09/11/2021    Colorectal Cancer Screening  10/01/2025    Hepatitis C Screening  Completed     Immunizations Due:  There are no preventive care reminders to display for this patient  Advance Directives   What are advance directives? Advance directives are legal documents that state your wishes and plans for medical care  These plans are made ahead of time in case you lose your ability to make decisions for yourself  Advance directives can apply to any medical decision, such as the treatments you want, and if you want to donate organs  What are the types of advance directives? There are many types of advance directives, and each state has rules about how to use them  You may choose a combination of any of the following:  · Living will: This is a written record of the treatment you want  You can also choose which treatments you do not want, which to limit, and which to stop at a certain time  This includes surgery, medicine, IV fluid, and tube feedings  · Durable power of  for healthcare [facility]): This is a written record that states who you want to make healthcare choices for you when you are unable to make them for yourself  This person, called a proxy, is usually a family member or a friend  You may choose more than 1 proxy  · Do not resuscitate (DNR) order:  A DNR order is used in case your heart stops beating or you stop breathing  It is a request not to have certain forms of treatment, such as CPR  A DNR order may be included in other types of advance directives  · Medical directive: This covers the care that you want if you are in a coma, near death, or unable to make decisions for yourself  You can list the treatments you want for each condition  Treatment may include pain medicine, surgery, blood transfusions, dialysis, IV or tube feedings, and a ventilator (breathing machine)  · Values history: This document has questions about your views, beliefs, and how you feel and think about life   This information can help others choose the care that you would choose  Why are advance directives important? An advance directive helps you control your care  Although spoken wishes may be used, it is better to have your wishes written down  Spoken wishes can be misunderstood, or not followed  Treatments may be given even if you do not want them  An advance directive may make it easier for your family to make difficult choices about your care  Urinary Incontinence   Urinary incontinence (UI)  is when you lose control of your bladder  UI develops because your bladder cannot store or empty urine properly  The 3 most common types of UI are stress incontinence, urge incontinence, or both  Medicines:   · May be given to help strengthen your bladder control  Report any side effects of medication to your healthcare provider  Do pelvic muscle exercises often:  Your pelvic muscles help you stop urinating  Squeeze these muscles tight for 5 seconds, then relax for 5 seconds  Gradually work up to squeezing for 10 seconds  Do 3 sets of 15 repetitions a day, or as directed  This will help strengthen your pelvic muscles and improve bladder control  Train your bladder:  Go to the bathroom at set times, such as every 2 hours, even if you do not feel the urge to go  You can also try to hold your urine when you feel the urge to go  For example, hold your urine for 5 minutes when you feel the urge to go  As that becomes easier, hold your urine for 10 minutes  Self-care:   · Keep a UI record  Write down how often you leak urine and how much you leak  Make a note of what you were doing when you leaked urine  · Drink liquids as directed  You may need to limit the amount of liquid you drink to help control your urine leakage  Do not drink any liquid right before you go to bed  Limit or do not have drinks that contain caffeine or alcohol  · Prevent constipation  Eat a variety of high-fiber foods   Good examples are high-fiber cereals, beans, vegetables, and whole-grain breads  Walking is the best way to trigger your intestines to have a bowel movement  · Exercise regularly and maintain a healthy weight  Weight loss and exercise will decrease pressure on your bladder and help you control your leakage  · Use a catheter as directed  to help empty your bladder  A catheter is a tiny, plastic tube that is put into your bladder to drain your urine  · Go to behavior therapy as directed  Behavior therapy may be used to help you learn to control your urge to urinate  Weight Management   Why it is important to manage your weight:  Being overweight increases your risk of health conditions such as heart disease, high blood pressure, type 2 diabetes, and certain types of cancer  It can also increase your risk for osteoarthritis, sleep apnea, and other respiratory problems  Aim for a slow, steady weight loss  Even a small amount of weight loss can lower your risk of health problems  How to lose weight safely:  A safe and healthy way to lose weight is to eat fewer calories and get regular exercise  You can lose up about 1 pound a week by decreasing the number of calories you eat by 500 calories each day  Healthy meal plan for weight management:  A healthy meal plan includes a variety of foods, contains fewer calories, and helps you stay healthy  A healthy meal plan includes the following:  · Eat whole-grain foods more often  A healthy meal plan should contain fiber  Fiber is the part of grains, fruits, and vegetables that is not broken down by your body  Whole-grain foods are healthy and provide extra fiber in your diet  Some examples of whole-grain foods are whole-wheat breads and pastas, oatmeal, brown rice, and bulgur  · Eat a variety of vegetables every day  Include dark, leafy greens such as spinach, kale, nathan greens, and mustard greens  Eat yellow and orange vegetables such as carrots, sweet potatoes, and winter squash  · Eat a variety of fruits every day  Choose fresh or canned fruit (canned in its own juice or light syrup) instead of juice  Fruit juice has very little or no fiber  · Eat low-fat dairy foods  Drink fat-free (skim) milk or 1% milk  Eat fat-free yogurt and low-fat cottage cheese  Try low-fat cheeses such as mozzarella and other reduced-fat cheeses  · Choose meat and other protein foods that are low in fat  Choose beans or other legumes such as split peas or lentils  Choose fish, skinless poultry (chicken or turkey), or lean cuts of red meat (beef or pork)  Before you cook meat or poultry, cut off any visible fat  · Use less fat and oil  Try baking foods instead of frying them  Add less fat, such as margarine, sour cream, regular salad dressing and mayonnaise to foods  Eat fewer high-fat foods  Some examples of high-fat foods include french fries, doughnuts, ice cream, and cakes  · Eat fewer sweets  Limit foods and drinks that are high in sugar  This includes candy, cookies, regular soda, and sweetened drinks  Exercise:  Exercise at least 30 minutes per day on most days of the week  Some examples of exercise include walking, biking, dancing, and swimming  You can also fit in more physical activity by taking the stairs instead of the elevator or parking farther away from stores  Ask your healthcare provider about the best exercise plan for you  © Copyright [employer] 2018 Information is for End User's use only and may not be sold, redistributed or otherwise used for commercial purposes   All illustrations and images included in CareNotes® are the copyrighted property of [employer]  or [address] [place] [address]

## 2021-06-23 NOTE — PROGRESS NOTES
Assessment and Plan:     Problem List Items Addressed This Visit     None      Visit Diagnoses     Encounter for screening mammogram for malignant neoplasm of breast    -  Primary    Relevant Orders    Mammo screening bilateral w 3d & cad        BMI Counseling: Body mass index is 37 44 kg/m²  The BMI is above normal  Nutrition recommendations include decreasing portion sizes  Exercise recommendations include exercising 3-5 times per week  No pharmacotherapy was ordered  Preventive health issues were discussed with patient, and age appropriate screening tests were ordered as noted in patient's After Visit Summary  Personalized health advice and appropriate referrals for health education or preventive services given if needed, as noted in patient's After Visit Summary       History of Present Illness:     Patient presents for Medicare Annual Wellness visit    Patient Care Team:  Reg MondayDO as PCP - General (Family Medicine)  DO Danii Miramontes CRNP     Problem List:     Patient Active Problem List   Diagnosis    Anticoagulant long-term use    Anxiety    Atrial fibrillation ([address] )    Bruit of left carotid artery    Chronic right-sided thoracic back pain    Esophageal reflux    Hiatal hernia    Hyperlipidemia    Hypothyroidism    Lower back pain    Neuropathic pain    Class 2 obesity due to excess calories without serious comorbidity with body mass index (BMI) of 35 0 to 35 9 in adult    Osteoporosis    Pain of right scapula    Pain syndrome, chronic    Paroxysmal atrial fibrillation (HCC)    Post herpetic neuralgia    Acute pain of right shoulder    Asymptomatic varicose veins    Vitamin D deficiency    Rash    Acute cystitis without hematuria    High risk medication use      Past Medical and Surgical History:     Past Medical History:   Diagnosis Date    Arthritis     Atrial fibrillation ([address] )     Back pain     Disease of thyroid gland     Fibrocystic breast disease  Ganglion cyst of wrist, right     Herpes zoster     Lyme disease     Postmenopausal hormone replacement therapy      Past Surgical History:   Procedure Laterality Date    BREAST BIOPSY      CARDIAC SURGERY      COLONOSCOPY        Family History:     Family History   Problem Relation Age of Onset    Diabetes Mother     Cerebral aneurysm Sister     Diabetes Maternal Grandmother     Heart disease Family       Social History:     Social History     Socioeconomic History    Marital status: /Civil Union     Spouse name: None    Number of children: None    Years of education: None    Highest education level: None   Occupational History    None   Tobacco Use    Smoking status: Never Smoker    Smokeless tobacco: Never Used   Vaping Use    Vaping Use: Never used   Substance and Sexual Activity    Alcohol use: No    Drug use: No    Sexual activity: None   Other Topics Concern    None   Social History Narrative    None     Social Determinants of Health     Financial Resource Strain:     Difficulty of Paying Living Expenses:    Food Insecurity:     Worried About Running Out of Food in the Last Year:     Ran Out of Food in the Last Year:    Transportation Needs:     Lack of Transportation (Medical):      Lack of Transportation (Non-Medical):    Physical Activity:     Days of Exercise per Week:     Minutes of Exercise per Session:    Stress:     Feeling of Stress :    Social Connections:     Frequency of Communication with Friends and Family:     Frequency of Social Gatherings with Friends and Family:     Attends Yarsanism Services:     Active Member of Clubs or Organizations:     Attends Club or Organization Meetings:     Marital Status:    Intimate Partner Violence:     Fear of Current or Ex-Partner:     Emotionally Abused:     Physically Abused:     Sexually Abused:       Medications and Allergies:     Current Outpatient Medications   Medication Sig Dispense Refill    Bioflavonoid Products (SUNI C PO) Take 500 mg by mouth daily      cholecalciferol (VITAMIN D3) 1,000 units tablet Take 5,000 Units by mouth daily      flecainide (TAMBOCOR) 100 mg tablet Take 100 mg by mouth 2 (two) times a day  6    glucosamine-chondroitin 500-400 MG tablet Take 1 tablet by mouth daily      KLOR-CON M20 20 MEQ tablet Take 20 mEq by mouth daily  1    levothyroxine 50 mcg tablet TAKE 1 TABLET BY MOUTH EVERY DAY 90 tablet 2    magnesium aspartate (MAGINEX) 615 MG tablet Take 200 mg by mouth daily      metoprolol succinate (TOPROL-XL) 25 mg 24 hr tablet Take 1 tablet by mouth daily      Multiple Vitamin (MULTIVITAMIN) tablet Take 2 tablets by mouth daily      Progesterone 40 % CREA Apply topically      Red Yeast Rice 600 MG CAPS Take 600 capsules by mouth        rivaroxaban (XARELTO) 20 mg tablet Take 1 tablet by mouth       No current facility-administered medications for this visit  No Known Allergies   Immunizations:     Immunization History   Administered Date(s) Administered    DT (pediatric) 11/19/1997    Influenza, high dose seasonal 0 7 mL 01/05/2021    Pneumococcal Conjugate 13-Valent 01/05/2021    SARS-CoV-2 / COVID-19 mRNA IM (Pfizer-BioNTIntroNiche) 03/27/2021, 04/17/2021    Tdap 10/17/2013, 10/17/2013      Health Maintenance:         Topic Date Due    MAMMOGRAM  09/11/2021    Colorectal Cancer Screening  10/01/2025    Hepatitis C Screening  Completed     There are no preventive care reminders to display for this patient  Medicare Health Risk Assessment:     /76   Pulse 60   Ht 5' 5" (1 651 m)   Wt 102 kg (225 lb)   SpO2 99%   BMI 37 44 kg/m²      Angy Renteria is here for her Initial Wellness visit  Last Medicare Wellness visit information reviewed, patient interviewed and updates made to the record today  Health Risk Assessment:   Patient rates overall health as good  Patient feels that their physical health rating is same  Patient is satisfied with their life  Eyesight was rated as same  Hearing was rated as same  Patient feels that their emotional and mental health rating is same  Patients states they are never, rarely angry  Patient states they are never, rarely unusually tired/fatigued  Pain experienced in the last 7 days has been some  Patient's pain rating has been 2/10  Patient states that she has experienced no weight loss or gain in last 6 months  Depression Screening:   PHQ-2 Score: 0      Fall Risk Screening: In the past year, patient has experienced: no history of falling in past year      Urinary Incontinence Screening:   Patient has leaked urine accidently in the last six months  Home Safety:  Patient does not have trouble with stairs inside or outside of their home  Patient has working smoke alarms and has no working carbon monoxide detector  Home safety hazards include: none  Nutrition:   Current diet is Regular  Medications:   Patient is currently taking over-the-counter supplements  OTC medications include: see medication list  Patient is able to manage medications  Activities of Daily Living (ADLs)/Instrumental Activities of Daily Living (IADLs):   Walk and transfer into and out of bed and chair?: Yes  Dress and groom yourself?: Yes    Bathe or shower yourself?: Yes    Feed yourself?  Yes  Do your laundry/housekeeping?: Yes  Manage your money, pay your bills and track your expenses?: Yes  Make your own meals?: Yes    Do your own shopping?: Yes    Previous Hospitalizations:   Any hospitalizations or ED visits within the last 12 months?: Yes    How many hospitalizations have you had in the last year?: 1-2    Advance Care Planning:   Living will: No    Durable POA for healthcare: No    Advanced directive: No      Cognitive Screening:   Provider or family/friend/caregiver concerned regarding cognition?: No    PREVENTIVE SCREENINGS      Cardiovascular Screening:    General: Screening Not Indicated and History Lipid Disorder Colorectal Cancer Screening:     General: Screening Current      Breast Cancer Screening:     General: Screening Current      Cervical Cancer Screening:    General: Screening Not Indicated      Osteoporosis Screening:    General: Screening Not Indicated and History Osteoporosis      Lung Cancer Screening:     General: Screening Not Indicated      Hepatitis C Screening:    General: Screening Current    Screening, Brief Intervention, and Referral to Treatment (SBIRT)    Screening  Typical number of drinks in a day: 0  Typical number of drinks in a week: 0  Interpretation: Low risk drinking behavior      Single Item Drug Screening:  How often have you used an illegal drug (including marijuana) or a prescription medication for non-medical reasons in the past year? never    Single Item Drug Screen Score: 0  Interpretation: Negative screen for possible drug use disorder      Gustavo Cifuentes, DO

## 2021-06-23 NOTE — PROGRESS NOTES
Assessment/Plan:      1  Medicare annual wellness visit, subsequent    2  Vertigo  Assessment & Plan:  Resolved, reviewed ct head and blood work from ED- normal      3  Lung nodule < 6cm on CT  Assessment & Plan:  3mm nodule, low risk, history of second hand smoke  Non smoker  Follow up 1 year      4  Encounter for screening mammogram for malignant neoplasm of breast  -     Mammo screening bilateral w 3d & cad; Future; Expected date: 06/23/2021        Subjective:  Chief Complaint   Patient presents with    Follow-up     pt is here for f/u [facility] hospital from [address] [place] [address] ED 5/27/21 for vertigo Sx nausea and vomiting   Medicare Wellness Visit     done today  mammogram ordered         Patient ID: Adina Cooper is a 78 y o  female  Pt is seen for medicare wellness and follow up from emergency department  Pt was seen at [facility] ED on 5/27 for vertigo, nausea and vomiting  Has not had symptoms since she was in the hospital  She has had vertigo in the past but did not have the nausea  She had a ct of her head and chest  The ct chest had a 3mm lung nodule  She is a nonsmoker  exposed to second hand smoke with her  smoking      Review of Systems   Constitutional: Negative  Negative for fatigue and fever  HENT: Negative  Eyes: Negative  Respiratory: Negative  Negative for cough  Cardiovascular: Negative  Gastrointestinal: Positive for nausea  Endocrine: Negative  Genitourinary: Negative  Musculoskeletal: Negative  Skin: Negative  Allergic/Immunologic: Negative  Neurological: Positive for dizziness  Negative for headaches  Psychiatric/Behavioral: Negative            The following portions of the patient's history were reviewed and updated as appropriate: allergies, current medications, past family history, past medical history, past social history, past surgical history and problem list     Objective:  Vitals:    06/23/21 1253   BP: 116/76   Pulse: 60   SpO2: 99% Weight: 102 kg (225 lb)   Height: 5' 5" (1 651 m)      Physical Exam  Vitals and nursing note reviewed  Constitutional:       Appearance: She is well-developed  She is obese  HENT:      Head: Normocephalic and atraumatic  Cardiovascular:      Rate and Rhythm: Normal rate and regular rhythm  Heart sounds: Normal heart sounds  Pulmonary:      Effort: Pulmonary effort is normal       Breath sounds: Normal breath sounds  Abdominal:      General: Bowel sounds are normal       Palpations: Abdomen is soft  Musculoskeletal:         General: No swelling  Right lower leg: No edema  Left lower leg: No edema  Skin:     General: Skin is warm and dry  Neurological:      Mental Status: She is alert and oriented to person, place, and time  Psychiatric:         Behavior: Behavior normal          Thought Content:  Thought content normal          Judgment: Judgment normal

## 2021-09-04 DIAGNOSIS — E03.9 ACQUIRED HYPOTHYROIDISM: ICD-10-CM

## 2021-09-07 RX ORDER — LEVOTHYROXINE SODIUM 0.05 MG/1
TABLET ORAL
Qty: 90 TABLET | Refills: 2 | Status: SHIPPED | OUTPATIENT
Start: 2021-09-07 | End: 2022-05-18

## 2021-09-20 ENCOUNTER — TELEPHONE (OUTPATIENT)
Dept: FAMILY MEDICINE CLINIC | Facility: CLINIC | Age: 71
End: 2021-09-20

## 2021-09-20 NOTE — TELEPHONE ENCOUNTER
----- Message from Shahnaz Sanchez DO sent at 9/17/2021  5:21 PM EDT -----  Please let the pt known that her mammogram was normal   Repeat in 1 year

## 2021-10-13 ENCOUNTER — TELEPHONE (OUTPATIENT)
Dept: FAMILY MEDICINE CLINIC | Facility: CLINIC | Age: 71
End: 2021-10-13

## 2021-10-22 ENCOUNTER — TELEPHONE (OUTPATIENT)
Dept: FAMILY MEDICINE CLINIC | Facility: CLINIC | Age: 71
End: 2021-10-22

## 2021-12-06 ENCOUNTER — TELEPHONE (OUTPATIENT)
Dept: FAMILY MEDICINE CLINIC | Facility: CLINIC | Age: 71
End: 2021-12-06

## 2021-12-06 DIAGNOSIS — R05.9 COUGH: Primary | ICD-10-CM

## 2021-12-06 RX ORDER — BENZONATATE 100 MG/1
100 CAPSULE ORAL 3 TIMES DAILY PRN
Qty: 20 CAPSULE | Refills: 0 | Status: SHIPPED | OUTPATIENT
Start: 2021-12-06 | End: 2022-07-26 | Stop reason: ALTCHOICE

## 2022-01-28 ENCOUNTER — CLINICAL SUPPORT (OUTPATIENT)
Dept: FAMILY MEDICINE CLINIC | Facility: CLINIC | Age: 72
End: 2022-01-28
Payer: COMMERCIAL

## 2022-01-28 DIAGNOSIS — Z23 NEED FOR VACCINATION: Primary | ICD-10-CM

## 2022-01-28 PROCEDURE — G0008 ADMIN INFLUENZA VIRUS VAC: HCPCS

## 2022-01-28 PROCEDURE — 90662 IIV NO PRSV INCREASED AG IM: CPT

## 2022-01-28 PROCEDURE — 90732 PPSV23 VACC 2 YRS+ SUBQ/IM: CPT

## 2022-01-28 PROCEDURE — G0009 ADMIN PNEUMOCOCCAL VACCINE: HCPCS

## 2022-05-18 DIAGNOSIS — E03.9 ACQUIRED HYPOTHYROIDISM: ICD-10-CM

## 2022-05-18 RX ORDER — LEVOTHYROXINE SODIUM 0.05 MG/1
TABLET ORAL
Qty: 90 TABLET | Refills: 2 | Status: SHIPPED | OUTPATIENT
Start: 2022-05-18

## 2022-07-26 ENCOUNTER — OFFICE VISIT (OUTPATIENT)
Dept: FAMILY MEDICINE CLINIC | Facility: CLINIC | Age: 72
End: 2022-07-26
Payer: COMMERCIAL

## 2022-07-26 VITALS
SYSTOLIC BLOOD PRESSURE: 120 MMHG | WEIGHT: 217 LBS | BODY MASS INDEX: 36.15 KG/M2 | HEART RATE: 57 BPM | HEIGHT: 65 IN | DIASTOLIC BLOOD PRESSURE: 76 MMHG | OXYGEN SATURATION: 99 % | TEMPERATURE: 96.9 F

## 2022-07-26 DIAGNOSIS — E66.09 CLASS 2 OBESITY DUE TO EXCESS CALORIES WITHOUT SERIOUS COMORBIDITY WITH BODY MASS INDEX (BMI) OF 35.0 TO 35.9 IN ADULT: ICD-10-CM

## 2022-07-26 DIAGNOSIS — E78.2 MIXED HYPERLIPIDEMIA: ICD-10-CM

## 2022-07-26 DIAGNOSIS — Z00.00 MEDICARE ANNUAL WELLNESS VISIT, SUBSEQUENT: Primary | ICD-10-CM

## 2022-07-26 DIAGNOSIS — I48.0 PAROXYSMAL ATRIAL FIBRILLATION (HCC): ICD-10-CM

## 2022-07-26 DIAGNOSIS — I07.1 MODERATE TRICUSPID REGURGITATION: ICD-10-CM

## 2022-07-26 DIAGNOSIS — IMO0001 LUNG NODULE < 6CM ON CT: ICD-10-CM

## 2022-07-26 DIAGNOSIS — I34.0 MILD MITRAL REGURGITATION BY PRIOR ECHOCARDIOGRAM: ICD-10-CM

## 2022-07-26 PROCEDURE — 1090F PRES/ABSN URINE INCON ASSESS: CPT | Performed by: FAMILY MEDICINE

## 2022-07-26 PROCEDURE — 1160F RVW MEDS BY RX/DR IN RCRD: CPT | Performed by: FAMILY MEDICINE

## 2022-07-26 PROCEDURE — G0439 PPPS, SUBSEQ VISIT: HCPCS | Performed by: FAMILY MEDICINE

## 2022-07-26 PROCEDURE — 1003F LEVEL OF ACTIVITY ASSESS: CPT | Performed by: FAMILY MEDICINE

## 2022-07-26 PROCEDURE — 3725F SCREEN DEPRESSION PERFORMED: CPT | Performed by: FAMILY MEDICINE

## 2022-07-26 PROCEDURE — 3288F FALL RISK ASSESSMENT DOCD: CPT | Performed by: FAMILY MEDICINE

## 2022-07-26 PROCEDURE — 1170F FXNL STATUS ASSESSED: CPT | Performed by: FAMILY MEDICINE

## 2022-07-26 NOTE — PATIENT INSTRUCTIONS
Evaluation with pulmonologist  Evaluation with cardiologist   Medicare Preventive Visit Patient Instructions  Thank you for completing your Welcome to Medicare Visit or Medicare Annual Wellness Visit today  Your next wellness visit will be due in one year (7/27/2023)  The screening/preventive services that you may require over the next 5-10 years are detailed below  Some tests may not apply to you based off risk factors and/or age  Screening tests ordered at today's visit but not completed yet may show as past due  Also, please note that scanned in results may not display below  Preventive Screenings:  Service Recommendations Previous Testing/Comments   Colorectal Cancer Screening  * Colonoscopy    * Fecal Occult Blood Test (FOBT)/Fecal Immunochemical Test (FIT)  * Fecal DNA/Cologuard Test  * Flexible Sigmoidoscopy Age: 54-65 years old   Colonoscopy: every 10 years (may be performed more frequently if at higher risk)  OR  FOBT/FIT: every 1 year  OR  Cologuard: every 3 years  OR  Sigmoidoscopy: every 5 years  Screening may be recommended earlier than age 48 if at higher risk for colorectal cancer  Also, an individualized decision between you and your healthcare provider will decide whether screening between the ages of 74-80 would be appropriate  Colonoscopy: 10/01/2020  FOBT/FIT: Not on file  Cologuard: Not on file  Sigmoidoscopy: Not on file    Screening Current     Breast Cancer Screening Age: 36 years old  Frequency: every 1-2 years  Not required if history of left and right mastectomy Mammogram: 09/16/2021    Screening Current   Cervical Cancer Screening Between the ages of 21-29, pap smear recommended once every 3 years  Between the ages of 33-67, can perform pap smear with HPV co-testing every 5 years     Recommendations may differ for women with a history of total hysterectomy, cervical cancer, or abnormal pap smears in past  Pap Smear: Not on file    Screening Not Indicated   Hepatitis C Screening Once for adults born between 1945 and 1965  More frequently in patients at high risk for Hepatitis C Hep C Antibody: 06/14/2017    Screening Current   Diabetes Screening 1-2 times per year if you're at risk for diabetes or have pre-diabetes Fasting glucose: No results in last 5 years   A1C: No results in last 5 years        Cholesterol Screening Once every 5 years if you don't have a lipid disorder  May order more often based on risk factors  Lipid panel: Not on file    Screening Not Indicated  History Lipid Disorder     Other Preventive Screenings Covered by Medicare:  Abdominal Aortic Aneurysm (AAA) Screening: covered once if your at risk  You're considered to be at risk if you have a family history of AAA  Lung Cancer Screening: covers low dose CT scan once per year if you meet all of the following conditions: (1) Age 50-69; (2) No signs or symptoms of lung cancer; (3) Current smoker or have quit smoking within the last 15 years; (4) You have a tobacco smoking history of at least 30 pack years (packs per day multiplied by number of years you smoked); (5) You get a written order from a healthcare provider  Glaucoma Screening: covered annually if you're considered high risk: (1) You have diabetes OR (2) Family history of glaucoma OR (3)  aged 48 and older OR (3)  American aged 72 and older  Osteoporosis Screening: covered every 2 years if you meet one of the following conditions: (1) You're estrogen deficient and at risk for osteoporosis based off medical history and other findings; (2) Have a vertebral abnormality; (3) On glucocorticoid therapy for more than 3 months; (4) Have primary hyperparathyroidism; (5) On osteoporosis medications and need to assess response to drug therapy  Last bone density test (DXA Scan): 08/13/2018  HIV Screening: covered annually if you're between the age of 12-76   Also covered annually if you are younger than 13 and older than 72 with risk factors for HIV infection  For pregnant patients, it is covered up to 3 times per pregnancy  Immunizations:  Immunization Recommendations   Influenza Vaccine Annual influenza vaccination during flu season is recommended for all persons aged >= 6 months who do not have contraindications   Pneumococcal Vaccine (Prevnar and Pneumovax)  * Prevnar = PCV13  * Pneumovax = PPSV23   Adults 25-60 years old: 1-3 doses may be recommended based on certain risk factors  Adults 72 years old: Prevnar (PCV13) vaccine recommended followed by Pneumovax (PPSV23) vaccine  If already received PPSV23 since turning 65, then PCV13 recommended at least one year after PPSV23 dose  Hepatitis B Vaccine 3 dose series if at intermediate or high risk (ex: diabetes, end stage renal disease, liver disease)   Tetanus (Td) Vaccine - COST NOT COVERED BY MEDICARE PART B Following completion of primary series, a booster dose should be given every 10 years to maintain immunity against tetanus  Td may also be given as tetanus wound prophylaxis  Tdap Vaccine - COST NOT COVERED BY MEDICARE PART B Recommended at least once for all adults  For pregnant patients, recommended with each pregnancy  Shingles Vaccine (Shingrix) - COST NOT COVERED BY MEDICARE PART B  2 shot series recommended in those aged 48 and above     Health Maintenance Due:      Topic Date Due    Breast Cancer Screening: Mammogram  09/16/2022    Colorectal Cancer Screening  10/01/2025    Hepatitis C Screening  Completed     Immunizations Due:      Topic Date Due    COVID-19 Vaccine (4 - Booster for Pfizer series) 02/28/2022    Influenza Vaccine (1) 09/01/2022     Advance Directives   What are advance directives? Advance directives are legal documents that state your wishes and plans for medical care  These plans are made ahead of time in case you lose your ability to make decisions for yourself   Advance directives can apply to any medical decision, such as the treatments you want, and if you want to donate organs  What are the types of advance directives? There are many types of advance directives, and each state has rules about how to use them  You may choose a combination of any of the following:  Living will: This is a written record of the treatment you want  You can also choose which treatments you do not want, which to limit, and which to stop at a certain time  This includes surgery, medicine, IV fluid, and tube feedings  [facility] power of  for [facility]): This is a written record that states who you want to make healthcare choices for you when you are unable to make them for yourself  This person, called a proxy, is usually a family member or a friend  You may choose more than 1 proxy  Do not resuscitate (DNR) order:  A DNR order is used in case your heart stops beating or you stop breathing  It is a request not to have certain forms of treatment, such as CPR  A DNR order may be included in other types of advance directives  Medical directive: This covers the care that you want if you are in a coma, near death, or unable to make decisions for yourself  You can list the treatments you want for each condition  Treatment may include pain medicine, surgery, blood transfusions, dialysis, IV or tube feedings, and a ventilator (breathing machine)  Values history: This document has questions about your views, beliefs, and how you feel and think about life  This information can help others choose the care that you would choose  Why are advance directives important? An advance directive helps you control your care  Although spoken wishes may be used, it is better to have your wishes written down  Spoken wishes can be misunderstood, or not followed  Treatments may be given even if you do not want them  An advance directive may make it easier for your family to make difficult choices about your care     Urinary Incontinence   Urinary incontinence (UI)  is when you lose control of your bladder  UI develops because your bladder cannot store or empty urine properly  The 3 most common types of UI are stress incontinence, urge incontinence, or both  Medicines:   May be given to help strengthen your bladder control  Report any side effects of medication to your healthcare provider  Do pelvic muscle exercises often:  Your pelvic muscles help you stop urinating  Squeeze these muscles tight for 5 seconds, then relax for 5 seconds  Gradually work up to squeezing for 10 seconds  Do 3 sets of 15 repetitions a day, or as directed  This will help strengthen your pelvic muscles and improve bladder control  Train your bladder:  Go to the bathroom at set times, such as every 2 hours, even if you do not feel the urge to go  You can also try to hold your urine when you feel the urge to go  For example, hold your urine for 5 minutes when you feel the urge to go  As that becomes easier, hold your urine for 10 minutes  Self-care:   Keep a UI record  Write down how often you leak urine and how much you leak  Make a note of what you were doing when you leaked urine  Drink liquids as directed  You may need to limit the amount of liquid you drink to help control your urine leakage  Do not drink any liquid right before you go to bed  Limit or do not have drinks that contain caffeine or alcohol  Prevent constipation  Eat a variety of high-fiber foods  Good examples are high-fiber cereals, beans, vegetables, and whole-grain breads  Walking is the best way to trigger your intestines to have a bowel movement  Exercise regularly and maintain a healthy weight  Weight loss and exercise will decrease pressure on your bladder and help you control your leakage  Use a catheter as directed  to help empty your bladder  A catheter is a tiny, plastic tube that is put into your bladder to drain your urine  Go to behavior therapy as directed    Behavior therapy may be used to help you learn to control your urge to urinate  Weight Management   Why it is important to manage your weight:  Being overweight increases your risk of health conditions such as heart disease, high blood pressure, type 2 diabetes, and certain types of cancer  It can also increase your risk for osteoarthritis, sleep apnea, and other respiratory problems  Aim for a slow, steady weight loss  Even a small amount of weight loss can lower your risk of health problems  How to lose weight safely:  A safe and healthy way to lose weight is to eat fewer calories and get regular exercise  You can lose up about 1 pound a week by decreasing the number of calories you eat by 500 calories each day  Healthy meal plan for weight management:  A healthy meal plan includes a variety of foods, contains fewer calories, and helps you stay healthy  A healthy meal plan includes the following:  Eat whole-grain foods more often  A healthy meal plan should contain fiber  Fiber is the part of grains, fruits, and vegetables that is not broken down by your body  Whole-grain foods are healthy and provide extra fiber in your diet  Some examples of whole-grain foods are whole-wheat breads and pastas, oatmeal, brown rice, and bulgur  Eat a variety of vegetables every day  Include dark, leafy greens such as spinach, kale, nathan greens, and mustard greens  Eat yellow and orange vegetables such as carrots, sweet potatoes, and winter squash  Eat a variety of fruits every day  Choose fresh or canned fruit (canned in its own juice or light syrup) instead of juice  Fruit juice has very little or no fiber  Eat low-fat dairy foods  Drink fat-free (skim) milk or 1% milk  Eat fat-free yogurt and low-fat cottage cheese  Try low-fat cheeses such as mozzarella and other reduced-fat cheeses  Choose meat and other protein foods that are low in fat  Choose beans or other legumes such as split peas or lentils   Choose fish, skinless poultry (chicken or turkey), or lean cuts of red meat (beef or pork)  Before you cook meat or poultry, cut off any visible fat  Use less fat and oil  Try baking foods instead of frying them  Add less fat, such as margarine, sour cream, regular salad dressing and mayonnaise to foods  Eat fewer high-fat foods  Some examples of high-fat foods include french fries, doughnuts, ice cream, and cakes  Eat fewer sweets  Limit foods and drinks that are high in sugar  This includes candy, cookies, regular soda, and sweetened drinks  Exercise:  Exercise at least 30 minutes per day on most days of the week  Some examples of exercise include walking, biking, dancing, and swimming  You can also fit in more physical activity by taking the stairs instead of the elevator or parking farther away from stores  Ask your healthcare provider about the best exercise plan for you  © Copyright [employer] 2018 Information is for End User's use only and may not be sold, redistributed or otherwise used for commercial purposes   All illustrations and images included in CareNotes® are the copyrighted property of [employer] M [employer]  or [address] [place] [address]

## 2022-07-26 NOTE — PROGRESS NOTES
Assessment and Plan:     Problem List Items Addressed This Visit        Cardiovascular and Mediastinum    Paroxysmal atrial fibrillation (HCC)    Mild mitral regurgitation by prior echocardiogram     Would like cardiology second opinion about echo         Moderate tricuspid regurgitation     Would like cardiology second opinion about echo            Other    Hyperlipidemia    Class 2 obesity due to excess calories without serious comorbidity with body mass index (BMI) of 35 0 to 35 9 in adult    Lung nodule < 6cm on CT    Medicare annual wellness visit, subsequent - Primary        BMI Counseling: Body mass index is 36 11 kg/m²  The BMI is above normal  Nutrition recommendations include decreasing portion sizes  Exercise recommendations include exercising 3-5 times per week  No pharmacotherapy was ordered  Rationale for BMI follow-up plan is due to patient being overweight or obese  Depression Screening and Follow-up Plan: Patient was screened for depression during today's encounter  They screened negative with a PHQ-2 score of 0  Preventive health issues were discussed with patient, and age appropriate screening tests were ordered as noted in patient's After Visit Summary  Personalized health advice and appropriate referrals for health education or preventive services given if needed, as noted in patient's After Visit Summary  History of Present Illness:     Patient presents for a Medicare Wellness Visit    Pt had a ct scan of her lungs on 5/27/2021  Has appt in [facility]-with pulmonologist 3mm nodule  Patient Care Team:  Ronit Berg DO as PCP - General (Family Medicine)  DO Lee Ann Rivas CRNP     Review of Systems:     Review of Systems   Constitutional: Negative  Negative for fatigue and fever  HENT: Negative  Eyes: Negative  Respiratory: Negative  Negative for cough  Cardiovascular: Negative  Gastrointestinal: Negative  Endocrine: Negative  Genitourinary: Negative  Musculoskeletal: Negative  Skin: Negative  Allergic/Immunologic: Negative  Neurological: Negative  Psychiatric/Behavioral: Negative           Problem List:     Patient Active Problem List   Diagnosis    Anticoagulant long-term use    Anxiety    Atrial fibrillation (HCC)    Bruit of left carotid artery    Chronic right-sided thoracic back pain    Esophageal reflux    Hiatal hernia    Hyperlipidemia    Hypothyroidism    Lower back pain    Neuropathic pain    Class 2 obesity due to excess calories without serious comorbidity with body mass index (BMI) of 35 0 to 35 9 in adult    Osteoporosis    Pain of right scapula    Pain syndrome, chronic    Paroxysmal atrial fibrillation (HCC)    Post herpetic neuralgia    Acute pain of right shoulder    Asymptomatic varicose veins    Vitamin D deficiency    Rash    Acute cystitis without hematuria    High risk medication use    Encounter for screening mammogram for malignant neoplasm of breast    Lung nodule < 6cm on CT    Vertigo    Medicare annual wellness visit, subsequent    Mild mitral regurgitation by prior echocardiogram    Moderate tricuspid regurgitation      Past Medical and Surgical History:     Past Medical History:   Diagnosis Date    Arthritis     Atrial fibrillation (HCC)     Back pain     Disease of thyroid gland     Fibrocystic breast disease     Ganglion cyst of wrist, right     Herpes zoster     Lyme disease     Postmenopausal hormone replacement therapy      Past Surgical History:   Procedure Laterality Date    BREAST BIOPSY      CARDIAC SURGERY      COLONOSCOPY        Family History:     Family History   Problem Relation Age of Onset    Diabetes Mother     Cerebral aneurysm Sister     Diabetes Maternal Grandmother     Heart disease Family       Social History:     Social History     Socioeconomic History    Marital status: /Civil Union     Spouse name: None    Number of children: None    Years of education: None    Highest education level: None   Occupational History    None   Tobacco Use    Smoking status: Never Smoker    Smokeless tobacco: Never Used   Vaping Use    Vaping Use: Never used   Substance and Sexual Activity    Alcohol use: No    Drug use: No    Sexual activity: None   Other Topics Concern    None   Social History Narrative    None     Social Determinants of Health     Financial Resource Strain: Not on file   Food Insecurity: Not on file   Transportation Needs: Not on file   Physical Activity: Not on file   Stress: Not on file   Social Connections: Not on file   Intimate Partner Violence: Not on file   Housing Stability: Not on file      Medications and Allergies:     Current Outpatient Medications   Medication Sig Dispense Refill    Bioflavonoid Products (SUNI C PO) Take 500 mg by mouth daily      cholecalciferol (VITAMIN D3) 1,000 units tablet Take 5,000 Units by mouth daily      flecainide (TAMBOCOR) 100 mg tablet Take 100 mg by mouth 2 (two) times a day  6    glucosamine-chondroitin 500-400 MG tablet Take 1 tablet by mouth daily      KLOR-CON M20 20 MEQ tablet Take 20 mEq by mouth daily  1    levothyroxine 50 mcg tablet TAKE 1 TABLET BY MOUTH EVERY DAY 90 tablet 2    magnesium aspartate (MAGINEX) 615 MG tablet Take 200 mg by mouth daily      metoprolol succinate (TOPROL-XL) 25 mg 24 hr tablet Take 1 tablet by mouth daily      Multiple Vitamin (MULTIVITAMIN) tablet Take 2 tablets by mouth daily      Progesterone 40 % CREA Apply topically      Red Yeast Rice 600 MG CAPS Take 600 capsules by mouth        rivaroxaban (XARELTO) 20 mg tablet Take 1 tablet by mouth       No current facility-administered medications for this visit       No Known Allergies   Immunizations:     Immunization History   Administered Date(s) Administered    COVID-19 PFIZER VACCINE 0 3 ML IM 03/27/2021, 04/17/2021, 10/30/2021, 04/05/2022    DT (pediatric) 11/19/1997  INFLUENZA 01/28/2022    Influenza, high dose seasonal 0 7 mL 01/05/2021, 01/28/2022    Pneumococcal Conjugate 13-Valent 01/05/2021    Pneumococcal Polysaccharide PPV23 01/28/2022    Tdap 10/17/2013, 10/17/2013      Health Maintenance:         Topic Date Due    Breast Cancer Screening: Mammogram  09/16/2022    Colorectal Cancer Screening  10/01/2025    Hepatitis C Screening  Completed         Topic Date Due    Influenza Vaccine (1) 09/01/2022      Medicare Screening Tests and Risk Assessments:     Ildefonso Mayen is here for her Subsequent Wellness visit  Last Medicare Wellness visit information reviewed, patient interviewed and updates made to the record today  Health Risk Assessment:   Patient rates overall health as very good  Patient feels that their physical health rating is slightly better  Patient is satisfied with their life  Eyesight was rated as same  Hearing was rated as same  Patient feels that their emotional and mental health rating is slightly better  Patients states they are never, rarely angry  Patient states they are sometimes unusually tired/fatigued  Pain experienced in the last 7 days has been some  Patient's pain rating has been 6/10  Patient states that she has experienced no weight loss or gain in last 6 months  Depression Screening:   PHQ-2 Score: 0      Fall Risk Screening: In the past year, patient has experienced: no history of falling in past year      Urinary Incontinence Screening:   Patient has not leaked urine accidently in the last six months  Home Safety:  Patient does not have trouble with stairs inside or outside of their home  Patient has working smoke alarms and has no working carbon monoxide detector  Home safety hazards include: none  Nutrition:   Current diet is Regular  Medications:   Patient is currently taking over-the-counter supplements  OTC medications include: see medication list  Patient is able to manage medications       Activities of Daily Living (ADLs)/Instrumental Activities of Daily Living (IADLs):   Walk and transfer into and out of bed and chair?: Yes  Dress and groom yourself?: Yes    Bathe or shower yourself?: Yes    Feed yourself? Yes  Do your laundry/housekeeping?: Yes  Manage your money, pay your bills and track your expenses?: Yes  Make your own meals?: Yes    Do your own shopping?: Yes    Previous Hospitalizations:   Any hospitalizations or ED visits within the last 12 months?: No      Advance Care Planning:   Living will: No    Durable POA for healthcare: Yes    Advanced directive: Yes      Cognitive Screening:   Provider or family/friend/caregiver concerned regarding cognition?: No    PREVENTIVE SCREENINGS      Cardiovascular Screening:    General: Screening Not Indicated and History Lipid Disorder      Colorectal Cancer Screening:     General: Screening Current      Breast Cancer Screening:     General: Screening Current      Cervical Cancer Screening:    General: Screening Not Indicated      Osteoporosis Screening:    General: Screening Not Indicated and History Osteoporosis      Lung Cancer Screening:     General: Screening Not Indicated      Hepatitis C Screening:    General: Screening Current    Screening, Brief Intervention, and Referral to Treatment (SBIRT)    Screening  Typical number of drinks in a day: 0  Typical number of drinks in a week: 0  Interpretation: Low risk drinking behavior  Single Item Drug Screening:  How often have you used an illegal drug (including marijuana) or a prescription medication for non-medical reasons in the past year? never    Single Item Drug Screen Score: 0  Interpretation: Negative screen for possible drug use disorder    No exam data present     Physical Exam:     /76   Pulse 57   Temp (!) 96 9 °F (36 1 °C)   Ht 5' 5" (1 651 m)   Wt 98 4 kg (217 lb)   SpO2 99%   BMI 36 11 kg/m²     Physical Exam  Vitals and nursing note reviewed     Constitutional:       Appearance: She is well-developed  HENT:      Head: Normocephalic and atraumatic  Right Ear: External ear normal       Left Ear: External ear normal       Nose: Nose normal    Eyes:      Conjunctiva/sclera: Conjunctivae normal       Pupils: Pupils are equal, round, and reactive to light  Cardiovascular:      Rate and Rhythm: Normal rate and regular rhythm  Heart sounds: Normal heart sounds  Pulmonary:      Effort: Pulmonary effort is normal       Breath sounds: Normal breath sounds  Abdominal:      General: Bowel sounds are normal       Palpations: Abdomen is soft  Musculoskeletal:         General: Normal range of motion  Cervical back: Normal range of motion and neck supple  Skin:     General: Skin is warm and dry  Neurological:      Mental Status: She is alert and oriented to person, place, and time  Psychiatric:         Behavior: Behavior normal          Thought Content:  Thought content normal          Judgment: Judgment normal           Sherron Valles,

## 2022-08-16 PROBLEM — M85.852 OSTEOPENIA OF NECK OF LEFT FEMUR: Status: ACTIVE | Noted: 2022-08-16

## 2022-08-16 NOTE — PROGRESS NOTES
Assessment/Plan:    Encounter for gynecological examination (general) (routine) without abnormal findings  Here for two year Medicare breast and pelvic exams   passed in July to lung cancer, grieving  No breast or gyn concerns  Normal breast and pelvic exams  Last pap 7/2017 neg/neg; no further indicated  Mammo order given, last 9/17/2021  Dexa order given to follow up hip and spine osteopenia  Colonoscopy 2020, due 2025       Diagnoses and all orders for this visit:    Encounter for gynecological examination (general) (routine) without abnormal findings    Osteopenia of neck of left femur  -     DXA bone density spine hip and pelvis; Future    Encounter for screening mammogram for breast cancer  -     Mammo screening bilateral w 3d & cad; Future          Subjective:      Patient ID: Dotty Henley is a 73 y o  female  Here for Medicare biannual breast and pelvic exams  The following portions of the patient's history were reviewed and updated as appropriate:   She  has a past medical history of Arthritis, Atrial fibrillation ([address] ), Back pain, Bursitis, Disease of thyroid gland, Fibrocystic breast disease, Ganglion cyst of wrist, right, Herpes zoster (2011), Hiatal hernia, Lung nodule, solitary, Lyme disease, Osteopenia of neck of left femur, Postmenopausal hormone replacement therapy, and Vertigo    She   Patient Active Problem List    Diagnosis Date Noted    Encounter for gynecological examination (general) (routine) without abnormal findings 08/18/2022    Osteopenia of neck of left femur 08/16/2022    Medicare annual wellness visit, subsequent 07/26/2022    Mild mitral regurgitation by prior echocardiogram 07/26/2022    Moderate tricuspid regurgitation 07/26/2022    Encounter for screening mammogram for malignant neoplasm of breast 06/23/2021    Lung nodule < 6cm on CT 06/23/2021    Vertigo 06/23/2021    Acute cystitis without hematuria 08/18/2020    Rash 06/25/2019    Chronic right-sided thoracic back pain 09/22/2017    Pain of right scapula 09/22/2017    Pain syndrome, chronic 09/22/2017    Post herpetic neuralgia 09/22/2017    Lower back pain 08/09/2017    Neuropathic pain 08/09/2017    Acute pain of right shoulder 08/09/2017    Hiatal hernia 07/12/2016    Vitamin D deficiency 04/04/2016    High risk medication use 12/07/2015    Anticoagulant long-term use 08/19/2015    Class 2 obesity due to excess calories without serious comorbidity with body mass index (BMI) of 35 0 to 35 9 in adult 08/19/2015    Paroxysmal atrial fibrillation ([facility] [address] ) 08/19/2015    Anxiety 07/30/2015    Atrial fibrillation ([facility] [address] ) 07/30/2015    Bruit of left carotid artery 07/30/2015    Hypothyroidism 07/30/2015    Osteoporosis 05/29/2013    Esophageal reflux 05/03/2013    Hyperlipidemia 05/03/2013    Asymptomatic varicose veins 05/03/2013     She  has a past surgical history that includes Colonoscopy (10/2020); Cardiac electrophysiology study and ablation (2015); Breast biopsy (Right); and DXA procedure(historical) (08/2018)  Her family history includes Cerebral aneurysm in her sister; Diabetes in her maternal grandmother and mother; Heart disease in her family  She  reports that she has never smoked  She has never used smokeless tobacco  She reports that she does not drink alcohol and does not use drugs    Current Outpatient Medications   Medication Sig Dispense Refill    Bioflavonoid Products (SUNI C PO) Take 500 mg by mouth daily      cholecalciferol (VITAMIN D3) 1,000 units tablet Take 5,000 Units by mouth daily      flecainide (TAMBOCOR) 100 mg tablet Take 100 mg by mouth 2 (two) times a day  6    glucosamine-chondroitin 500-400 MG tablet Take 1 tablet by mouth daily      KLOR-CON M20 20 MEQ tablet Take 20 mEq by mouth daily  1    levothyroxine 50 mcg tablet TAKE 1 TABLET BY MOUTH EVERY DAY 90 tablet 2    magnesium aspartate (MAGINEX) 615 MG tablet Take 200 mg by mouth daily      metoprolol succinate (TOPROL-XL) 25 mg 24 hr tablet Take 1 tablet by mouth daily      Multiple Vitamin (MULTIVITAMIN) tablet Take 2 tablets by mouth daily      Progesterone 40 % CREA Apply topically      Red Yeast Rice 600 MG CAPS Take 600 capsules by mouth        rivaroxaban (XARELTO) 20 mg tablet Take 1 tablet by mouth       No current facility-administered medications for this visit  She has No Known Allergies       Review of Systems  No PMB, breast, bladder, bowel changes  No new persistent pain     Unchanged mild PEEWEE      Objective:      /78   Ht 5' 4" (1 626 m)   Wt 98 4 kg (217 lb)   Breastfeeding No   BMI 37 25 kg/m²          Physical Exam    General appearance: no distress, pleasant  Neck: thyroid without nodules or thyromegaly, no palpable adenopathy  Lymph nodes: no palpable adenopathy  Breasts: no masses, nodes or skin changes  Abdomen: soft, non tender, no palpable masses  Pelvic exam: normal atrophic external genitalia, urethral meatus normal, vagina atrophic without lesions, cervix atrophic without lesions, uterus small, non tender, no adnexal masses, non tender  Rectal exam: normal sphincter tone, no masses, RV confirms above

## 2022-08-18 ENCOUNTER — ANNUAL EXAM (OUTPATIENT)
Dept: OBGYN CLINIC | Facility: CLINIC | Age: 72
End: 2022-08-18
Payer: COMMERCIAL

## 2022-08-18 VITALS
SYSTOLIC BLOOD PRESSURE: 130 MMHG | WEIGHT: 217 LBS | BODY MASS INDEX: 37.05 KG/M2 | HEIGHT: 64 IN | DIASTOLIC BLOOD PRESSURE: 78 MMHG

## 2022-08-18 DIAGNOSIS — Z12.31 ENCOUNTER FOR SCREENING MAMMOGRAM FOR BREAST CANCER: ICD-10-CM

## 2022-08-18 DIAGNOSIS — M85.852 OSTEOPENIA OF NECK OF LEFT FEMUR: ICD-10-CM

## 2022-08-18 DIAGNOSIS — Z01.419 ENCOUNTER FOR GYNECOLOGICAL EXAMINATION (GENERAL) (ROUTINE) WITHOUT ABNORMAL FINDINGS: Primary | ICD-10-CM

## 2022-08-18 PROCEDURE — G0101 CA SCREEN;PELVIC/BREAST EXAM: HCPCS | Performed by: OBSTETRICS & GYNECOLOGY

## 2022-08-18 NOTE — PATIENT INSTRUCTIONS
Return to office in two years unless having any problems such as breast changes, bleeding, new persistent pain, new progressive bloating, new problems eating (getting full to quickly) or new constant urinary pressure that does not resolve in one week  For the incontinence, I recommend bladder training with timed voids every 2-3 hours while awake, avoidance of irritants specifically tea/coffee/soda products and Kegel exercises  You can teach yourself the exercises by stopping your urine flow on the toilet  Then hold for 10 seconds, 10 times in a row  If you do that set of exercises 2-3 times per day (you do not have to be on the toilet), you will gain strength, then when you sneeze you can squeeze up first and getter better control

## 2022-08-18 NOTE — ASSESSMENT & PLAN NOTE
Here for two year Medicare breast and pelvic exams   passed in July to lung cancer, grieving  No breast or gyn concerns  Normal breast and pelvic exams  Last pap 7/2017 neg/neg; no further indicated  Mammo order given, last 9/17/2021  Dexa order given to follow up hip and spine osteopenia    Colonoscopy 2020, due 2025

## 2022-08-18 NOTE — LETTER
August 18, 2022     [address], [address] [place] [id number]    Patient: Mike Malone   YOB: 1950   Date of Visit: 8/18/2022       Dear Dr Raysa Lowry: Thank you for referring Gaurav Chatman to me for evaluation  Below are my notes for this consultation  If you have questions, please do not hesitate to call me  I look forward to following your patient along with you  Sincerely,        Rhonda Pope MD        CC: No Recipients  Rhonda Pope MD  8/18/2022  1:08 PM  Incomplete  Assessment/Plan:    Encounter for gynecological examination (general) (routine) without abnormal findings  Here for two year Medicare breast and pelvic exams   passed in July to lung cancer, grieving  No breast or gyn concerns  Normal breast and pelvic exams  Last pap 7/2017 neg/neg; no further indicated  Mammo order given, last 9/17/2021  Dexa order given to follow up hip and spine osteopenia  Colonoscopy 2020, due 2025       Diagnoses and all orders for this visit:    Encounter for gynecological examination (general) (routine) without abnormal findings    Osteopenia of neck of left femur  -     DXA bone density spine hip and pelvis; Future    Encounter for screening mammogram for breast cancer  -     Mammo screening bilateral w 3d & cad; Future          Subjective:      Patient ID: Mike Malone is a 73 y o  female  Here for Medicare biannual breast and pelvic exams  The following portions of the patient's history were reviewed and updated as appropriate:   She  has a past medical history of Arthritis, Atrial fibrillation ([address] ), Back pain, Bursitis, Disease of thyroid gland, Fibrocystic breast disease, Ganglion cyst of wrist, right, Herpes zoster (2011), Hiatal hernia, Lung nodule, solitary, Lyme disease, Osteopenia of neck of left femur, Postmenopausal hormone replacement therapy, and Vertigo    She   Patient Active Problem List    Diagnosis Date Noted    Encounter for gynecological examination (general) (routine) without abnormal findings 08/18/2022    Osteopenia of neck of left femur 08/16/2022    Medicare annual wellness visit, subsequent 07/26/2022    Mild mitral regurgitation by prior echocardiogram 07/26/2022    Moderate tricuspid regurgitation 07/26/2022    Encounter for screening mammogram for malignant neoplasm of breast 06/23/2021    Lung nodule < 6cm on CT 06/23/2021    Vertigo 06/23/2021    Acute cystitis without hematuria 08/18/2020    Rash 06/25/2019    Chronic right-sided thoracic back pain 09/22/2017    Pain of right scapula 09/22/2017    Pain syndrome, chronic 09/22/2017    Post herpetic neuralgia 09/22/2017    Lower back pain 08/09/2017    Neuropathic pain 08/09/2017    Acute pain of right shoulder 08/09/2017    Hiatal hernia 07/12/2016    Vitamin D deficiency 04/04/2016    High risk medication use 12/07/2015    Anticoagulant long-term use 08/19/2015    Class 2 obesity due to excess calories without serious comorbidity with body mass index (BMI) of 35 0 to 35 9 in adult 08/19/2015    Paroxysmal atrial fibrillation ([facility] [address] ) 08/19/2015    Anxiety 07/30/2015    Atrial fibrillation ([facility] [address] ) 07/30/2015    Bruit of left carotid artery 07/30/2015    Hypothyroidism 07/30/2015    Osteoporosis 05/29/2013    Esophageal reflux 05/03/2013    Hyperlipidemia 05/03/2013    Asymptomatic varicose veins 05/03/2013     She  has a past surgical history that includes Colonoscopy (10/2020); Cardiac electrophysiology study and ablation (2015); Breast biopsy (Right); and DXA procedure(historical) (08/2018)  Her family history includes Cerebral aneurysm in her sister; Diabetes in her maternal grandmother and mother; Heart disease in her family  She  reports that she has never smoked  She has never used smokeless tobacco  She reports that she does not drink alcohol and does not use drugs    Current Outpatient Medications   Medication Sig Dispense Refill    Bioflavonoid Products (SUNI C PO) Take 500 mg by mouth daily      cholecalciferol (VITAMIN D3) 1,000 units tablet Take 5,000 Units by mouth daily      flecainide (TAMBOCOR) 100 mg tablet Take 100 mg by mouth 2 (two) times a day  6    glucosamine-chondroitin 500-400 MG tablet Take 1 tablet by mouth daily      KLOR-CON M20 20 MEQ tablet Take 20 mEq by mouth daily  1    levothyroxine 50 mcg tablet TAKE 1 TABLET BY MOUTH EVERY DAY 90 tablet 2    magnesium aspartate (MAGINEX) 615 MG tablet Take 200 mg by mouth daily      metoprolol succinate (TOPROL-XL) 25 mg 24 hr tablet Take 1 tablet by mouth daily      Multiple Vitamin (MULTIVITAMIN) tablet Take 2 tablets by mouth daily      Progesterone 40 % CREA Apply topically      Red Yeast Rice 600 MG CAPS Take 600 capsules by mouth        rivaroxaban (XARELTO) 20 mg tablet Take 1 tablet by mouth       No current facility-administered medications for this visit  She has No Known Allergies       Review of Systems  No PMB, breast, bladder, bowel changes  No new persistent pain  Unchanged mild PEEWEE      Objective:      /78   Ht 5' 4" (1 626 m)   Wt 98 4 kg (217 lb)   Breastfeeding No   BMI 37 25 kg/m²          Physical Exam    General appearance: no distress, pleasant  Neck: thyroid without nodules or thyromegaly, no palpable adenopathy  Lymph nodes: no palpable adenopathy  Breasts: no masses, nodes or skin changes  Abdomen: soft, non tender, no palpable masses  Pelvic exam: normal atrophic external genitalia, urethral meatus normal, vagina atrophic without lesions, cervix atrophic without lesions, uterus small, non tender, no adnexal masses, non tender  Rectal exam: normal sphincter tone, no masses, RV confirms above      Tima Thomas MD  8/18/2022 12:52 PM  Sign when Signing Visit  Assessment/Plan:    Encounter for gynecological examination (general) (routine) without abnormal findings  Here for two year Medicare breast and pelvic exams  No concerns  Normal breast and pelvic exams  Last pap 7/2017 neg/neg; no further indicated  Mammo order given, last 9/17/2021  Dexa order given to follow up hip and spine osteopenia  Colonoscopy 2020, due 2025       Diagnoses and all orders for this visit:    Encounter for gynecological examination (general) (routine) without abnormal findings    Osteopenia of neck of left femur  -     DXA bone density spine hip and pelvis; Future          Subjective:      Patient ID: Genaro Castelan is a 73 y o  female  Here for Medicare biannual breast and pelvic exams  The following portions of the patient's history were reviewed and updated as appropriate:   She  has a past medical history of Arthritis, Atrial fibrillation ([address] ), Back pain, Bursitis, Disease of thyroid gland, Fibrocystic breast disease, Ganglion cyst of wrist, right, Herpes zoster (2011), Hiatal hernia, Lung nodule, solitary, Lyme disease, Osteopenia of neck of left femur, Postmenopausal hormone replacement therapy, and Vertigo    She   Patient Active Problem List    Diagnosis Date Noted    Encounter for gynecological examination (general) (routine) without abnormal findings 08/18/2022    Osteopenia of neck of left femur 08/16/2022    Medicare annual wellness visit, subsequent 07/26/2022    Mild mitral regurgitation by prior echocardiogram 07/26/2022    Moderate tricuspid regurgitation 07/26/2022    Encounter for screening mammogram for malignant neoplasm of breast 06/23/2021    Lung nodule < 6cm on CT 06/23/2021    Vertigo 06/23/2021    Acute cystitis without hematuria 08/18/2020    Rash 06/25/2019    Chronic right-sided thoracic back pain 09/22/2017    Pain of right scapula 09/22/2017    Pain syndrome, chronic 09/22/2017    Post herpetic neuralgia 09/22/2017    Lower back pain 08/09/2017    Neuropathic pain 08/09/2017    Acute pain of right shoulder 08/09/2017    Hiatal hernia 07/12/2016    Vitamin D deficiency 04/04/2016    High risk medication use 12/07/2015    Anticoagulant long-term use 08/19/2015    Class 2 obesity due to excess calories without serious comorbidity with body mass index (BMI) of 35 0 to 35 9 in adult 08/19/2015    Paroxysmal atrial fibrillation ([facility] ) 08/19/2015    Anxiety 07/30/2015    Atrial fibrillation ([facility] ) 07/30/2015    Bruit of left carotid artery 07/30/2015    Hypothyroidism 07/30/2015    Osteoporosis 05/29/2013    Esophageal reflux 05/03/2013    Hyperlipidemia 05/03/2013    Asymptomatic varicose veins 05/03/2013     She  has a past surgical history that includes Colonoscopy (10/2020); Cardiac electrophysiology study and ablation (2015); Breast biopsy (Right); and DXA procedure(historical) (08/2018)  Her family history includes Cerebral aneurysm in her sister; Diabetes in her maternal grandmother and mother; Heart disease in her family  She  reports that she has never smoked  She has never used smokeless tobacco  She reports that she does not drink alcohol and does not use drugs    Current Outpatient Medications   Medication Sig Dispense Refill    Bioflavonoid Products (SUNI C PO) Take 500 mg by mouth daily      cholecalciferol (VITAMIN D3) 1,000 units tablet Take 5,000 Units by mouth daily      flecainide (TAMBOCOR) 100 mg tablet Take 100 mg by mouth 2 (two) times a day  6    glucosamine-chondroitin 500-400 MG tablet Take 1 tablet by mouth daily      KLOR-CON M20 20 MEQ tablet Take 20 mEq by mouth daily  1    levothyroxine 50 mcg tablet TAKE 1 TABLET BY MOUTH EVERY DAY 90 tablet 2    magnesium aspartate (MAGINEX) 615 MG tablet Take 200 mg by mouth daily      metoprolol succinate (TOPROL-XL) 25 mg 24 hr tablet Take 1 tablet by mouth daily      Multiple Vitamin (MULTIVITAMIN) tablet Take 2 tablets by mouth daily      Progesterone 40 % CREA Apply topically      Red Yeast Rice 600 MG CAPS Take 600 capsules by mouth        rivaroxaban (XARELTO) 20 mg tablet Take 1 tablet by mouth       No current facility-administered medications for this visit  She has No Known Allergies       Review of Systems  No PMB, breast, bladder, bowel changes   No new persistent pain, bloating, early satiety or pelvic pressure      Objective:      /78   Ht 5' 4" (1 626 m)   Wt 98 4 kg (217 lb)   Breastfeeding No   BMI 37 25 kg/m²          Physical Exam    General appearance: no distress, pleasant  Neck: thyroid without nodules or thyromegaly, no palpable adenopathy  Lymph nodes: no palpable adenopathy  Breasts: no masses, nodes or skin changes  Abdomen: soft, non tender, no palpable masses  Pelvic exam: normal atrophic external genitalia, urethral meatus normal, vagina atrophic without lesions, cervix atrophic without lesions, uterus small, non tender, no adnexal masses, non tender  Rectal exam: normal sphincter tone, no masses, RV confirms above

## 2022-09-26 DIAGNOSIS — M85.852 OSTEOPENIA OF NECK OF LEFT FEMUR: ICD-10-CM

## 2022-09-26 DIAGNOSIS — Z12.31 ENCOUNTER FOR SCREENING MAMMOGRAM FOR BREAST CANCER: ICD-10-CM

## 2022-09-26 PROBLEM — M85.859 OSTEOPENIA OF HIP: Status: ACTIVE | Noted: 2022-08-16

## 2022-09-27 ENCOUNTER — TELEPHONE (OUTPATIENT)
Dept: OBGYN CLINIC | Facility: CLINIC | Age: 72
End: 2022-09-27

## 2022-09-27 NOTE — TELEPHONE ENCOUNTER
Left a message for patient informing Dexa showed stable osteopenia and low risk for fracture, if pt has any additional questions, contact the office

## 2022-10-05 ENCOUNTER — TELEPHONE (OUTPATIENT)
Dept: OBGYN CLINIC | Facility: CLINIC | Age: 72
End: 2022-10-05

## 2022-10-05 NOTE — TELEPHONE ENCOUNTER
Griselda Marvin called requesting results of mammogram  Advised normal with dense breast tissue  Continue with yearly screening    Patient verbalized understanding

## 2022-10-11 PROBLEM — Z00.00 MEDICARE ANNUAL WELLNESS VISIT, SUBSEQUENT: Status: RESOLVED | Noted: 2022-07-26 | Resolved: 2022-10-11

## 2022-10-14 ENCOUNTER — TELEPHONE (OUTPATIENT)
Dept: FAMILY MEDICINE CLINIC | Facility: CLINIC | Age: 72
End: 2022-10-14

## 2022-10-14 ENCOUNTER — IMMUNIZATIONS (OUTPATIENT)
Dept: FAMILY MEDICINE CLINIC | Facility: CLINIC | Age: 72
End: 2022-10-14
Payer: COMMERCIAL

## 2022-10-14 DIAGNOSIS — Z23 NEED FOR INFLUENZA VACCINATION: Primary | ICD-10-CM

## 2022-10-14 PROCEDURE — G0008 ADMIN INFLUENZA VIRUS VAC: HCPCS

## 2022-10-14 PROCEDURE — 90662 IIV NO PRSV INCREASED AG IM: CPT

## 2022-10-14 NOTE — TELEPHONE ENCOUNTER
Patient was in the office for a flu shot and wanted a message related to Nae  She had a visit with [facility] back on 5/27/21 and they detected nodules on her and she wanted to know who we can recommend for her to see for a second opinion  Pt states that Gibson Moe is aware and told her it's a good idea to get a second opinion but she wants to know who we recommend or what specialist should she see for this second opinion

## 2022-10-17 NOTE — TELEPHONE ENCOUNTER
It would be pulmonologist-   Charlie Brock pulmonology [phone]  Or Khris Srivastava [facility]- pulmonology [phone]

## 2022-12-12 ENCOUNTER — CONSULT (OUTPATIENT)
Dept: PULMONOLOGY | Facility: HOSPITAL | Age: 72
End: 2022-12-12

## 2022-12-12 VITALS
HEART RATE: 55 BPM | TEMPERATURE: 98.6 F | BODY MASS INDEX: 37.9 KG/M2 | DIASTOLIC BLOOD PRESSURE: 80 MMHG | SYSTOLIC BLOOD PRESSURE: 130 MMHG | WEIGHT: 222 LBS | RESPIRATION RATE: 16 BRPM | HEIGHT: 64 IN | OXYGEN SATURATION: 98 %

## 2022-12-12 DIAGNOSIS — E03.1 CONGENITAL HYPOTHYROIDISM WITHOUT GOITER: ICD-10-CM

## 2022-12-12 DIAGNOSIS — K21.9 GASTROESOPHAGEAL REFLUX DISEASE WITHOUT ESOPHAGITIS: ICD-10-CM

## 2022-12-12 DIAGNOSIS — I48.0 PAROXYSMAL ATRIAL FIBRILLATION (HCC): ICD-10-CM

## 2022-12-12 DIAGNOSIS — R91.1 LUNG NODULE: Primary | ICD-10-CM

## 2022-12-12 NOTE — ASSESSMENT & PLAN NOTE
Ludwin Mora and I reviewed the results of her most recent CT scan from May of 2021  At that time she had a 3 mm lung nodule  She is very concerned surrounding the death of her  of lung cancer that something is being overlooked  We talked about criteria for lung cancer and for following of lung nodules  That being said she did have a lifelong secondhand exposure to smoke living with her  so I felt it prudent for us to get a follow-up CT scan  I will schedule this at today's visit and review with her after  She is up-to-date on all her vaccines and screening testing  She denies any respiratory complaints

## 2022-12-12 NOTE — PROGRESS NOTES
Assessment/Plan:    Lung nodule  Brittany Major and I reviewed the results of her most recent CT scan from May of 2021  At that time she had a 3 mm lung nodule  She is very concerned surrounding the death of her  of lung cancer that something is being overlooked  We talked about criteria for lung cancer and for following of lung nodules  That being said she did have a lifelong secondhand exposure to smoke living with her  so I felt it prudent for us to get a follow-up CT scan  I will schedule this at today's visit and review with her after  She is up-to-date on all her vaccines and screening testing  She denies any respiratory complaints  Esophageal reflux  Currently asymptomatic    Atrial fibrillation (HCC)  Normal heart rate and blood pressure on today's visit       Diagnoses and all orders for this visit:    Lung nodule  -     CT chest without contrast; Future    Paroxysmal atrial fibrillation (HCC)    Congenital hypothyroidism without goiter    Gastroesophageal reflux disease without esophagitis          Subjective:      Patient ID: Alexandra Benavides is a 81 y o  female  Brittany Major is here for a 2nd opinion regarding her lung nodules  She had an abnormal CT scan in May of 2021  She denies any shortness of breath no cough no respiratory history  She denies any history of asthma or COPD and she is a lifelong nonsmoker  She lives with her  for many years who was a heavy everyday smoker  She denies any pets or occupational exposures  Her medical history is inclusive for AFib and hypothyroid  She gets regular mammograms OBGYN visits and colonoscopies  The following portions of the patient's history were reviewed and updated as appropriate: allergies, current medications, past family history, past medical history, past social history, past surgical history and problem list     Review of Systems   Constitutional: Negative  Negative for unexpected weight change  HENT: Negative  Negative for postnasal drip  Eyes: Negative  Respiratory: Negative  Negative for cough, shortness of breath and wheezing  Cardiovascular: Negative  Negative for chest pain and leg swelling  Gastrointestinal: Negative  Endocrine: Negative  Genitourinary: Negative  Musculoskeletal: Negative  Allergic/Immunologic: Negative  Neurological: Negative  Hematological: Negative  Objective:      /80 (BP Location: Right arm, Patient Position: Sitting, Cuff Size: Adult)   Pulse 55   Temp 98 6 °F (37 °C) (Tympanic)   Resp 16   Ht 5' 4" (1 626 m)   Wt 101 kg (222 lb)   SpO2 98%   BMI 38 11 kg/m²          Physical Exam  Constitutional:       Appearance: She is well-developed  HENT:      Head: Normocephalic  Eyes:      Pupils: Pupils are equal, round, and reactive to light  Cardiovascular:      Rate and Rhythm: Normal rate  Heart sounds: No murmur heard  Pulmonary:      Effort: Pulmonary effort is normal  No respiratory distress  Breath sounds: Normal breath sounds  No wheezing or rales  Abdominal:      Palpations: Abdomen is soft  Musculoskeletal:         General: Normal range of motion  Cervical back: Normal range of motion and neck supple  Skin:     General: Skin is warm and dry  Neurological:      Mental Status: She is alert and oriented to person, place, and time

## 2022-12-22 ENCOUNTER — HOSPITAL ENCOUNTER (OUTPATIENT)
Dept: CT IMAGING | Facility: HOSPITAL | Age: 72
Discharge: HOME | End: 2022-12-22
Attending: INTERNAL MEDICINE

## 2022-12-22 DIAGNOSIS — R91.1 LUNG NODULE: ICD-10-CM

## 2023-01-27 ENCOUNTER — OFFICE VISIT (OUTPATIENT)
Dept: PULMONOLOGY | Facility: HOSPITAL | Age: 73
End: 2023-01-27

## 2023-01-27 VITALS
WEIGHT: 226 LBS | HEART RATE: 57 BPM | HEIGHT: 64 IN | DIASTOLIC BLOOD PRESSURE: 74 MMHG | TEMPERATURE: 96.6 F | BODY MASS INDEX: 38.58 KG/M2 | SYSTOLIC BLOOD PRESSURE: 128 MMHG | OXYGEN SATURATION: 99 %

## 2023-01-27 DIAGNOSIS — E66.01 OBESITY, MORBID (HCC): ICD-10-CM

## 2023-01-27 DIAGNOSIS — I48.91 ATRIAL FIBRILLATION, UNSPECIFIED TYPE (HCC): ICD-10-CM

## 2023-01-27 DIAGNOSIS — R91.1 LUNG NODULE: Primary | ICD-10-CM

## 2023-01-27 DIAGNOSIS — I48.0 PAROXYSMAL ATRIAL FIBRILLATION (HCC): ICD-10-CM

## 2023-01-27 NOTE — ASSESSMENT & PLAN NOTE
I reviewed Briana's CAT scan myself and while they are calling for subcentimeter lung nodules I can only identify 2  They are stable since her last CAT scan 18 months ago  These are most certainly benign but will continue follow-up for total of 2 years repeating her scan in December 2023

## 2023-01-27 NOTE — ASSESSMENT & PLAN NOTE
Describes several episodes of vertigo and falling recently which make me concerned for orthostatic hypotension  We did a 6-minute walk in the office today which revealed a normal heart rate response to exercise it went from 55-91 with exertion  I did orthostatics on her and at laying she was 142/74 dropped to 122/78 with sitting rebounded to 124 and 138 standing after 1 and 3 minutes respectively  This is borderline hypotension likely related to her medications  At this point given the risk to benefit I suggested she continue her medications but be aware that she may need to take time when she stands up suddenly

## 2023-01-27 NOTE — PROGRESS NOTES
Assessment/Plan:    Paroxysmal atrial fibrillation (HCC)    Describes several episodes of vertigo and falling recently which make me concerned for orthostatic hypotension  We did a 6-minute walk in the office today which revealed a normal heart rate response to exercise it went from 55-91 with exertion  I did orthostatics on her and at laying she was 142/74 dropped to 122/78 with sitting rebounded to 124 and 138 standing after 1 and 3 minutes respectively  This is borderline hypotension likely related to her medications  At this point given the risk to benefit I suggested she continue her medications but be aware that she may need to take time when she stands up suddenly  Lung nodule  I reviewed Briana's CAT scan myself and while they are calling for subcentimeter lung nodules I can only identify 2  They are stable since her last CAT scan 18 months ago  These are most certainly benign but will continue follow-up for total of 2 years repeating her scan in December 2023  Diagnoses and all orders for this visit:    Lung nodule    Atrial fibrillation, unspecified type ([facility] [address] )  -     POCT 6 minute walk    Obesity, morbid (HCC)    Paroxysmal atrial fibrillation (HCC)          Subjective:      Patient ID: Brandon Valles is a 81 y o  female  Carolyn Mauricio is here for follow-up of her subcentimeter nodules  She denies any shortness of breath or any changes in her health  She has reported some increased vertigo dizziness when she walks and at least one episode of a fall  The following portions of the patient's history were reviewed and updated as appropriate: allergies, current medications, past family history, past medical history, past social history, past surgical history and problem list     Review of Systems   Constitutional: Negative  Negative for unexpected weight change  HENT: Negative  Negative for postnasal drip  Eyes: Negative  Respiratory: Negative    Negative for cough, shortness of breath and wheezing  Cardiovascular: Negative  Negative for chest pain and leg swelling  Gastrointestinal: Negative  Endocrine: Negative  Genitourinary: Negative  Musculoskeletal: Negative  Allergic/Immunologic: Negative  Neurological: Negative  Hematological: Negative  Objective:      /74 (BP Location: Left arm, Patient Position: Sitting, Cuff Size: Standard)   Pulse 57   Temp (!) 96 6 °F (35 9 °C) (Tympanic)   Ht 5' 4" (1 626 m)   Wt 103 kg (226 lb)   SpO2 99%   BMI 38 79 kg/m²          Physical Exam  Constitutional:       Appearance: She is well-developed  HENT:      Head: Normocephalic  Eyes:      Pupils: Pupils are equal, round, and reactive to light  Cardiovascular:      Rate and Rhythm: Normal rate  Heart sounds: No murmur heard  Pulmonary:      Effort: Pulmonary effort is normal  No respiratory distress  Breath sounds: Normal breath sounds  No wheezing or rales  Abdominal:      Palpations: Abdomen is soft  Musculoskeletal:         General: Normal range of motion  Cervical back: Normal range of motion and neck supple  Skin:     General: Skin is warm and dry  Neurological:      Mental Status: She is alert and oriented to person, place, and time

## 2023-03-14 DIAGNOSIS — E03.9 ACQUIRED HYPOTHYROIDISM: ICD-10-CM

## 2023-03-14 RX ORDER — LEVOTHYROXINE SODIUM 0.05 MG/1
TABLET ORAL
Qty: 90 TABLET | Refills: 2 | Status: SHIPPED | OUTPATIENT
Start: 2023-03-14

## 2023-09-19 ENCOUNTER — TELEPHONE (OUTPATIENT)
Dept: OBGYN CLINIC | Facility: CLINIC | Age: 73
End: 2023-09-19

## 2023-09-22 ENCOUNTER — TELEPHONE (OUTPATIENT)
Dept: OBGYN CLINIC | Facility: CLINIC | Age: 73
End: 2023-09-22

## 2023-09-22 DIAGNOSIS — Z12.31 ENCOUNTER FOR SCREENING MAMMOGRAM FOR BREAST CANCER: Primary | ICD-10-CM

## 2023-09-22 NOTE — TELEPHONE ENCOUNTER
Mammogram order placed. Left v/m informing patient that she can either pick the script up at any of our offices or we can send it to her home address.

## 2023-09-22 NOTE — TELEPHONE ENCOUNTER
----- Message from Alise Connelly MD sent at 9/22/2023  3:53 PM EDT -----  Regarding: Mammo order  Please provide mammo order per pt as requested. ----- Message -----  From: Adarsh Kitchen  Sent: 9/19/2023   2:16 PM EDT  To: Alise Connelly MD    Pt is medicare every 2 years, she is requesting mammo order.

## 2023-10-17 ENCOUNTER — TELEPHONE (OUTPATIENT)
Dept: FAMILY MEDICINE CLINIC | Facility: CLINIC | Age: 73
End: 2023-10-17

## 2023-10-23 DIAGNOSIS — Z12.31 ENCOUNTER FOR SCREENING MAMMOGRAM FOR BREAST CANCER: ICD-10-CM

## 2023-11-28 ENCOUNTER — TELEPHONE (OUTPATIENT)
Dept: FAMILY MEDICINE CLINIC | Facility: CLINIC | Age: 73
End: 2023-11-28

## 2023-12-11 ENCOUNTER — RA CDI HCC (OUTPATIENT)
Dept: OTHER | Facility: HOSPITAL | Age: 73
End: 2023-12-11

## 2023-12-11 NOTE — PROGRESS NOTES
[address] [facility] coding opportunities          Chart Reviewed number of suggestions sent to Provider: 1     Patients Insurance   [id number]  Medicare Insurance: [facility]

## 2023-12-12 ENCOUNTER — IMMUNIZATIONS (OUTPATIENT)
Dept: FAMILY MEDICINE CLINIC | Facility: CLINIC | Age: 73
End: 2023-12-12
Payer: COMMERCIAL

## 2023-12-12 DIAGNOSIS — Z23 ENCOUNTER FOR IMMUNIZATION: Primary | ICD-10-CM

## 2023-12-12 PROCEDURE — 90662 IIV NO PRSV INCREASED AG IM: CPT

## 2023-12-12 PROCEDURE — G0008 ADMIN INFLUENZA VIRUS VAC: HCPCS

## 2023-12-14 ENCOUNTER — OFFICE VISIT (OUTPATIENT)
Dept: FAMILY MEDICINE CLINIC | Facility: CLINIC | Age: 73
End: 2023-12-14
Payer: COMMERCIAL

## 2023-12-14 VITALS
WEIGHT: 228 LBS | OXYGEN SATURATION: 99 % | HEART RATE: 60 BPM | HEIGHT: 64 IN | BODY MASS INDEX: 38.93 KG/M2 | DIASTOLIC BLOOD PRESSURE: 90 MMHG | TEMPERATURE: 97.1 F | SYSTOLIC BLOOD PRESSURE: 142 MMHG

## 2023-12-14 DIAGNOSIS — E66.01 CLASS 2 SEVERE OBESITY DUE TO EXCESS CALORIES WITH SERIOUS COMORBIDITY AND BODY MASS INDEX (BMI) OF 39.0 TO 39.9 IN ADULT: ICD-10-CM

## 2023-12-14 DIAGNOSIS — E03.9 HYPOTHYROIDISM, UNSPECIFIED TYPE: ICD-10-CM

## 2023-12-14 DIAGNOSIS — I48.0 PAROXYSMAL ATRIAL FIBRILLATION (HCC): ICD-10-CM

## 2023-12-14 DIAGNOSIS — R32 URINARY INCONTINENCE, UNSPECIFIED TYPE: ICD-10-CM

## 2023-12-14 DIAGNOSIS — M25.561 CHRONIC PAIN OF RIGHT KNEE: ICD-10-CM

## 2023-12-14 DIAGNOSIS — G89.29 CHRONIC PAIN OF RIGHT KNEE: ICD-10-CM

## 2023-12-14 DIAGNOSIS — Z00.00 MEDICARE ANNUAL WELLNESS VISIT, SUBSEQUENT: Primary | ICD-10-CM

## 2023-12-14 PROCEDURE — G0439 PPPS, SUBSEQ VISIT: HCPCS | Performed by: NURSE PRACTITIONER

## 2023-12-14 NOTE — PROGRESS NOTES
Assessment and Plan:     Problem List Items Addressed This Visit        Endocrine    Hypothyroidism     May have had labs done in September 2023 will call [facility] to check  Continue levothyroxine 50mcg daily            Cardiovascular and Mediastinum    Paroxysmal atrial fibrillation (HCC)     Follows with cardiology  Continue xarelto, metoprolol, flecainide        Other Visit Diagnoses     Medicare annual wellness visit, subsequent    -  Primary    Class 2 severe obesity due to excess calories with serious comorbidity and body mass index (BMI) of 39.0 to 39.9 in adult         Chronic pain of right knee        check xray    Relevant Orders    XR knee 3 vw right non injury    Urinary incontinence, unspecified type            BMI Counseling: Body mass index is 39.14 kg/m². The BMI is above normal. Nutrition recommendations include decreasing portion sizes, encouraging healthy choices of fruits and vegetables, decreasing fast food intake, consuming healthier snacks, limiting drinks that contain sugar, moderation in carbohydrate intake, increasing intake of lean protein and reducing intake of cholesterol. Rationale for BMI follow-up plan is due to patient being overweight or obese.     Depression Screening and Follow-up Plan: Patient was screened for depression during today's encounter. They screened negative with a PHQ-2 score of 0.    Urinary Incontinence Plan of Care: counseling topics discussed: practice Kegel (pelvic floor strengthening) exercises, use restroom every 2 hours, limit alcohol, caffeine, spicy foods, and acidic foods, limiting fluid intake 3-4 hours before bed, weight loss and preventing constipation.       Preventive health issues were discussed with patient, and age appropriate screening tests were ordered as noted in patient's After Visit Summary.  Personalized health advice and appropriate referrals for health education or preventive services given if needed, as noted in patient's After Visit  Summary.     History of Present Illness:     Patient presents for a Medicare Wellness Visit    Follows with cardiology in [facility]- Dr Ace, Dr Harman  Had mammogram 9/27/23- [place]  Labs 9/1/23 with [facility]  No recent labs in our system since 2021  Due for CT scan end of year for lung nodule follow up=follows with pulmonary.              Patient Care Team:  Paty Baird DO as PCP - General (Family Medicine)  DO Andrey Bella CRNP     Review of Systems:     Review of Systems   Constitutional: Negative.  Negative for fatigue and fever.   HENT: Negative.     Eyes: Negative.    Respiratory: Negative.  Negative for cough.    Cardiovascular: Negative.    Gastrointestinal: Negative.    Endocrine: Negative.    Genitourinary: Negative.    Musculoskeletal:  Positive for arthralgias (right knee pain).   Skin: Negative.    Allergic/Immunologic: Negative.    Neurological: Negative.    Psychiatric/Behavioral: Negative.          Problem List:     Patient Active Problem List   Diagnosis   • Anticoagulant long-term use   • Anxiety   • Atrial fibrillation (HCC)   • Bruit of left carotid artery   • Chronic right-sided thoracic back pain   • Esophageal reflux   • Hiatal hernia   • Hyperlipidemia   • Hypothyroidism   • Lower back pain   • Neuropathic pain   • Obesity, morbid (HCC)   • Osteoporosis   • Pain of right scapula   • Pain syndrome, chronic   • Paroxysmal atrial fibrillation (HCC)   • Post herpetic neuralgia   • Acute pain of right shoulder   • Asymptomatic varicose veins   • Vitamin D deficiency   • Rash   • Acute cystitis without hematuria   • High risk medication use   • Encounter for screening mammogram for malignant neoplasm of breast   • Lung nodule < 6cm on CT   • Vertigo   • Mild mitral regurgitation by prior echocardiogram   • Moderate tricuspid regurgitation   • Osteopenia of hip   • Encounter for gynecological examination (general) (routine) without abnormal findings   • Lung nodule       Past Medical and Surgical History:     Past Medical History:   Diagnosis Date   • Arthritis    • Atrial fibrillation (HCC)    • Back pain    • Bursitis    • Disease of thyroid gland    • Fibrocystic breast disease    • Ganglion cyst of wrist, right    • Herpes zoster 2011   • Hiatal hernia    • Lung nodule, solitary     3 mm nodule   • Lyme disease    • Osteopenia of neck of left femur     Dexa due 2022   • Postmenopausal hormone replacement therapy    • Vertigo      Past Surgical History:   Procedure Laterality Date   • BREAST BIOPSY Right    • CARDIAC ELECTROPHYSIOLOGY STUDY AND ABLATION  2015   • COLONOSCOPY  10/2020    Due 2025   • DXA PROCEDURE (HISTORICAL)  08/2018    Fem neck T-1.9, low risk of fracture      Family History:     Family History   Problem Relation Age of Onset   • Diabetes Mother    • Cerebral aneurysm Sister    • Diabetes Maternal Grandmother    • Heart disease Family    • Breast cancer Neg Hx    • Uterine cancer Neg Hx    • Ovarian cancer Neg Hx    • Colon cancer Neg Hx       Social History:     Social History     Socioeconomic History   • Marital status: /Civil Union     Spouse name: None   • Number of children: None   • Years of education: None   • Highest education level: None   Occupational History   • None   Tobacco Use   • Smoking status: Never   • Smokeless tobacco: Never   Vaping Use   • Vaping status: Never Used   Substance and Sexual Activity   • Alcohol use: No   • Drug use: No   • Sexual activity: Not Currently   Other Topics Concern   • None   Social History Narrative   • None     Social Determinants of Health     Financial Resource Strain: Low Risk  (12/14/2023)    Overall Financial Resource Strain (CARDIA)    • Difficulty of Paying Living Expenses: Not hard at all   Food Insecurity: Not on file   Transportation Needs: No Transportation Needs (12/14/2023)    PRAPARE - Transportation    • Lack of Transportation (Medical): No    • Lack of Transportation (Non-Medical):  No   Physical Activity: Not on file   Stress: Not on file   Social Connections: Not on file   Intimate Partner Violence: Not on file   Housing Stability: Not on file      Medications and Allergies:     Current Outpatient Medications   Medication Sig Dispense Refill   • Bioflavonoid Products (SUNI C PO) Take 500 mg by mouth daily     • cholecalciferol (VITAMIN D3) 1,000 units tablet Take 5,000 Units by mouth daily     • flecainide (TAMBOCOR) 100 mg tablet Take 100 mg by mouth 2 (two) times a day  6   • glucosamine-chondroitin 500-400 MG tablet Take 1 tablet by mouth daily     • KLOR-CON M20 20 MEQ tablet Take 20 mEq by mouth daily  1   • levothyroxine 50 mcg tablet TAKE 1 TABLET BY MOUTH EVERY DAY 90 tablet 2   • metoprolol succinate (TOPROL-XL) 25 mg 24 hr tablet Take 1 tablet by mouth daily     • Multiple Vitamin (MULTIVITAMIN) tablet Take 2 tablets by mouth daily     • Progesterone 40 % CREA Apply topically     • Red Yeast Rice 600 MG CAPS Take 600 capsules by mouth       • rivaroxaban (XARELTO) 20 mg tablet Take 1 tablet by mouth     • magnesium aspartate (MAGINEX) 615 MG tablet Take 200 mg by mouth daily (Patient not taking: Reported on 1/27/2023)       No current facility-administered medications for this visit.     No Known Allergies   Immunizations:     Immunization History   Administered Date(s) Administered   • COVID-19 PFIZER VACCINE 0.3 ML IM 03/27/2021, 04/17/2021, 10/30/2021, 04/05/2022   • DT (pediatric) 11/19/1997   • INFLUENZA 01/28/2022, 10/14/2022, 12/12/2023   • Influenza, high dose seasonal 0.7 mL 01/05/2021, 01/28/2022, 10/14/2022, 12/12/2023   • Pneumococcal Conjugate 13-Valent 01/05/2021   • Pneumococcal Polysaccharide PPV23 01/28/2022   • Tdap 10/17/2013, 10/17/2013      Health Maintenance:         Topic Date Due   • Breast Cancer Screening: Mammogram  09/27/2024   • Colorectal Cancer Screening  10/01/2025   • DXA SCAN  09/19/2027   • Hepatitis C Screening  Completed         Topic Date Due    • COVID-19 Vaccine (5 - 2023-24 season) 09/01/2023      Medicare Screening Tests and Risk Assessments:     Briana is here for her Subsequent Wellness visit. Last Medicare Wellness visit information reviewed, patient interviewed and updates made to the record today.      Health Risk Assessment:   Patient rates overall health as very good. Patient feels that their physical health rating is same. Patient is satisfied with their life. Eyesight was rated as same. Hearing was rated as same. Patient feels that their emotional and mental health rating is same. Patients states they are never, rarely angry. Patient states they are sometimes unusually tired/fatigued. Pain experienced in the last 7 days has been some. Patient's pain rating has been 3/10. Patient states that she has experienced weight loss or gain in last 6 months.     Depression Screening:   PHQ-2 Score: 0      Fall Risk Screening:   In the past year, patient has experienced: history of falling in past year    Number of falls: 1  Injured during fall?: No    Feels unsteady when standing or walking?: No    Worried about falling?: No      Urinary Incontinence Screening:   Patient has leaked urine accidently in the last six months.     Home Safety:  Patient does not have trouble with stairs inside or outside of their home. Patient has working smoke alarms and has no working carbon monoxide detector. Home safety hazards include: none.     Nutrition:   Current diet is Regular.     Medications:   Patient is currently taking over-the-counter supplements. OTC medications include: see medication list. Patient is able to manage medications.     Activities of Daily Living (ADLs)/Instrumental Activities of Daily Living (IADLs):   Walk and transfer into and out of bed and chair?: Yes  Dress and groom yourself?: Yes    Bathe or shower yourself?: Yes    Feed yourself? Yes  Do your laundry/housekeeping?: Yes  Manage your money, pay your bills and track your expenses?:  "Yes  Make your own meals?: Yes    Do your own shopping?: Yes    Previous Hospitalizations:   Any hospitalizations or ED visits within the last 12 months?: No      Advance Care Planning:   Living will: Yes    Advanced directive: Yes      PREVENTIVE SCREENINGS      Cardiovascular Screening:    General: Screening Not Indicated, History Lipid Disorder and Screening Current      Diabetes Screening:     General: Screening Current      Colorectal Cancer Screening:     General: Screening Current      Breast Cancer Screening:     General: Screening Current      Cervical Cancer Screening:    General: Screening Not Indicated and Screening Current      Osteoporosis Screening:    General: Screening Not Indicated, History Osteoporosis and Screening Current      Abdominal Aortic Aneurysm (AAA) Screening:        General: Screening Not Indicated      Lung Cancer Screening:     General: Screening Not Indicated      Hepatitis C Screening:    General: Screening Current    Screening, Brief Intervention, and Referral to Treatment (SBIRT)    Screening  Typical number of drinks in a day: 0  Typical number of drinks in a week: 0  Interpretation: Low risk drinking behavior.    Single Item Drug Screening:  How often have you used an illegal drug (including marijuana) or a prescription medication for non-medical reasons in the past year? never    Single Item Drug Screen Score: 0  Interpretation: Negative screen for possible drug use disorder    No results found.     Physical Exam:     /90   Pulse 60   Temp (!) 97.1 °F (36.2 °C) (Tympanic)   Ht 5' 4\" (1.626 m)   Wt 103 kg (228 lb)   SpO2 99%   BMI 39.14 kg/m²     Physical Exam  Vitals and nursing note reviewed.   Constitutional:       General: She is not in acute distress.     Appearance: Normal appearance. She is well-developed. She is obese.   HENT:      Head: Normocephalic and atraumatic.      Right Ear: External ear normal.      Left Ear: External ear normal.      Nose: Nose " normal.   Eyes:      Conjunctiva/sclera: Conjunctivae normal.      Pupils: Pupils are equal, round, and reactive to light.   Cardiovascular:      Rate and Rhythm: Normal rate and regular rhythm.      Heart sounds: Normal heart sounds.   Pulmonary:      Effort: Pulmonary effort is normal.      Breath sounds: Normal breath sounds.   Abdominal:      General: Bowel sounds are normal.      Palpations: Abdomen is soft.   Musculoskeletal:         General: Normal range of motion.      Cervical back: Normal range of motion and neck supple.   Skin:     General: Skin is warm and dry.   Neurological:      Mental Status: She is alert and oriented to person, place, and time.   Psychiatric:         Behavior: Behavior normal.         Thought Content: Thought content normal.         Judgment: Judgment normal.          RICARDO Hassan

## 2023-12-14 NOTE — PATIENT INSTRUCTIONS
Can try voltaren gel to right knee to see if it helps, check xray of right knee    Medicare Preventive Visit Patient Instructions  Thank you for completing your Welcome to Medicare Visit or Medicare Annual Wellness Visit today. Your next wellness visit will be due in one year (12/14/2024).  The screening/preventive services that you may require over the next 5-10 years are detailed below. Some tests may not apply to you based off risk factors and/or age. Screening tests ordered at today's visit but not completed yet may show as past due. Also, please note that scanned in results may not display below.  Preventive Screenings:  Service Recommendations Previous Testing/Comments   Colorectal Cancer Screening  * Colonoscopy    * Fecal Occult Blood Test (FOBT)/Fecal Immunochemical Test (FIT)  * Fecal DNA/Cologuard Test  * Flexible Sigmoidoscopy Age: 45-75 years old   Colonoscopy: every 10 years (may be performed more frequently if at higher risk)  OR  FOBT/FIT: every 1 year  OR  Cologuard: every 3 years  OR  Sigmoidoscopy: every 5 years  Screening may be recommended earlier than age 45 if at higher risk for colorectal cancer. Also, an individualized decision between you and your healthcare provider will decide whether screening between the ages of 76-85 would be appropriate. Colonoscopy: 10/01/2020  FOBT/FIT: Not on file  Cologuard: Not on file  Sigmoidoscopy: Not on file    Screening Current     Breast Cancer Screening Age: 40+ years old  Frequency: every 1-2 years  Not required if history of left and right mastectomy Mammogram: 09/27/2023    Screening Current   Cervical Cancer Screening Between the ages of 21-29, pap smear recommended once every 3 years.   Between the ages of 30-65, can perform pap smear with HPV co-testing every 5 years.   Recommendations may differ for women with a history of total hysterectomy, cervical cancer, or abnormal pap smears in past. Pap Smear: 08/18/2022    Screening Not Indicated    Hepatitis C Screening Once for adults born between 1945 and 1965  More frequently in patients at high risk for Hepatitis C Hep C Antibody: 06/14/2017    Screening Current   Diabetes Screening 1-2 times per year if you're at risk for diabetes or have pre-diabetes Fasting glucose: No results in last 5 years (No results in last 5 years)  A1C: No results in last 5 years (No results in last 5 years)      Cholesterol Screening Once every 5 years if you don't have a lipid disorder. May order more often based on risk factors. Lipid panel: Not on file    Screening Not Indicated  History Lipid Disorder     Other Preventive Screenings Covered by Medicare:  Abdominal Aortic Aneurysm (AAA) Screening: covered once if your at risk. You're considered to be at risk if you have a family history of AAA.  Lung Cancer Screening: covers low dose CT scan once per year if you meet all of the following conditions: (1) Age 55-77; (2) No signs or symptoms of lung cancer; (3) Current smoker or have quit smoking within the last 15 years; (4) You have a tobacco smoking history of at least 20 pack years (packs per day multiplied by number of years you smoked); (5) You get a written order from a healthcare provider.  Glaucoma Screening: covered annually if you're considered high risk: (1) You have diabetes OR (2) Family history of glaucoma OR (3)  aged 50 and older OR (4)  American aged 65 and older  Osteoporosis Screening: covered every 2 years if you meet one of the following conditions: (1) You're estrogen deficient and at risk for osteoporosis based off medical history and other findings; (2) Have a vertebral abnormality; (3) On glucocorticoid therapy for more than 3 months; (4) Have primary hyperparathyroidism; (5) On osteoporosis medications and need to assess response to drug therapy.   Last bone density test (DXA Scan): 09/19/2022.  HIV Screening: covered annually if you're between the age of 15-65. Also covered  annually if you are younger than 15 and older than 65 with risk factors for HIV infection. For pregnant patients, it is covered up to 3 times per pregnancy.    Immunizations:  Immunization Recommendations   Influenza Vaccine Annual influenza vaccination during flu season is recommended for all persons aged >= 6 months who do not have contraindications   Pneumococcal Vaccine   * Pneumococcal conjugate vaccine = PCV13 (Prevnar 13), PCV15 (Vaxneuvance), PCV20 (Prevnar 20)  * Pneumococcal polysaccharide vaccine = PPSV23 (Pneumovax) Adults 19-65 yo with certain risk factors or if 65+ yo  If never received any pneumonia vaccine: recommend Prevnar 20 (PCV20)  Give PCV20 if previously received 1 dose of PCV13 or PPSV23   Hepatitis B Vaccine 3 dose series if at intermediate or high risk (ex: diabetes, end stage renal disease, liver disease)   Respiratory syncytial virus (RSV) Vaccine - COVERED BY MEDICARE PART D  * RSVPreF3 (Arexvy) CDC recommends that adults 60 years of age and older may receive a single dose of RSV vaccine using shared clinical decision-making (SCDM)   Tetanus (Td) Vaccine - COST NOT COVERED BY MEDICARE PART B Following completion of primary series, a booster dose should be given every 10 years to maintain immunity against tetanus. Td may also be given as tetanus wound prophylaxis.   Tdap Vaccine - COST NOT COVERED BY MEDICARE PART B Recommended at least once for all adults. For pregnant patients, recommended with each pregnancy.   Shingles Vaccine (Shingrix) - COST NOT COVERED BY MEDICARE PART B  2 shot series recommended in those 19 years and older who have or will have weakened immune systems or those 50 years and older     Health Maintenance Due:      Topic Date Due    Breast Cancer Screening: Mammogram  09/27/2024    Colorectal Cancer Screening  10/01/2025    DXA SCAN  09/19/2027    Hepatitis C Screening  Completed     Immunizations Due:      Topic Date Due    COVID-19 Vaccine (5 - 2023-24 season)  09/01/2023     Advance Directives   What are advance directives?  Advance directives are legal documents that state your wishes and plans for medical care. These plans are made ahead of time in case you lose your ability to make decisions for yourself. Advance directives can apply to any medical decision, such as the treatments you want, and if you want to donate organs.   What are the types of advance directives?  There are many types of advance directives, and each state has rules about how to use them. You may choose a combination of any of the following:  Living will:  This is a written record of the treatment you want. You can also choose which treatments you do not want, which to limit, and which to stop at a certain time. This includes surgery, medicine, IV fluid, and tube feedings.   Durable power of  for healthcare (DPAHC):  This is a written record that states who you want to make healthcare choices for you when you are unable to make them for yourself. This person, called a proxy, is usually a family member or a friend. You may choose more than 1 proxy.  Do not resuscitate (DNR) order:  A DNR order is used in case your heart stops beating or you stop breathing. It is a request not to have certain forms of treatment, such as CPR. A DNR order may be included in other types of advance directives.  Medical directive:  This covers the care that you want if you are in a coma, near death, or unable to make decisions for yourself. You can list the treatments you want for each condition. Treatment may include pain medicine, surgery, blood transfusions, dialysis, IV or tube feedings, and a ventilator (breathing machine).  Values history:  This document has questions about your views, beliefs, and how you feel and think about life. This information can help others choose the care that you would choose.  Why are advance directives important?  An advance directive helps you control your care. Although spoken  wishes may be used, it is better to have your wishes written down. Spoken wishes can be misunderstood, or not followed. Treatments may be given even if you do not want them. An advance directive may make it easier for your family to make difficult choices about your care.   Weight Management   Why it is important to manage your weight:  Being overweight increases your risk of health conditions such as heart disease, high blood pressure, type 2 diabetes, and certain types of cancer. It can also increase your risk for osteoarthritis, sleep apnea, and other respiratory problems. Aim for a slow, steady weight loss. Even a small amount of weight loss can lower your risk of health problems.  How to lose weight safely:  A safe and healthy way to lose weight is to eat fewer calories and get regular exercise. You can lose up about 1 pound a week by decreasing the number of calories you eat by 500 calories each day.   Healthy meal plan for weight management:  A healthy meal plan includes a variety of foods, contains fewer calories, and helps you stay healthy. A healthy meal plan includes the following:  Eat whole-grain foods more often.  A healthy meal plan should contain fiber. Fiber is the part of grains, fruits, and vegetables that is not broken down by your body. Whole-grain foods are healthy and provide extra fiber in your diet. Some examples of whole-grain foods are whole-wheat breads and pastas, oatmeal, brown rice, and bulgur.  Eat a variety of vegetables every day.  Include dark, leafy greens such as spinach, kale, nathan greens, and mustard greens. Eat yellow and orange vegetables such as carrots, sweet potatoes, and winter squash.   Eat a variety of fruits every day.  Choose fresh or canned fruit (canned in its own juice or light syrup) instead of juice. Fruit juice has very little or no fiber.  Eat low-fat dairy foods.  Drink fat-free (skim) milk or 1% milk. Eat fat-free yogurt and low-fat cottage cheese. Try  low-fat cheeses such as mozzarella and other reduced-fat cheeses.  Choose meat and other protein foods that are low in fat.  Choose beans or other legumes such as split peas or lentils. Choose fish, skinless poultry (chicken or turkey), or lean cuts of red meat (beef or pork). Before you cook meat or poultry, cut off any visible fat.   Use less fat and oil.  Try baking foods instead of frying them. Add less fat, such as margarine, sour cream, regular salad dressing and mayonnaise to foods. Eat fewer high-fat foods. Some examples of high-fat foods include french fries, doughnuts, ice cream, and cakes.  Eat fewer sweets.  Limit foods and drinks that are high in sugar. This includes candy, cookies, regular soda, and sweetened drinks.  Exercise:  Exercise at least 30 minutes per day on most days of the week. Some examples of exercise include walking, biking, dancing, and swimming. You can also fit in more physical activity by taking the stairs instead of the elevator or parking farther away from stores. Ask your healthcare provider about the best exercise plan for you.      © Copyright "[employer]" 2018 Information is for End User's use only and may not be sold, redistributed or otherwise used for commercial purposes. All illustrations and images included in CareNotes® are the copyrighted property of [employer]., Inc. or [employer]

## 2023-12-14 NOTE — ASSESSMENT & PLAN NOTE
May have had labs done in September 2023 will call [facility] to check  Continue levothyroxine 50mcg daily

## 2023-12-28 ENCOUNTER — TELEPHONE (OUTPATIENT)
Dept: FAMILY MEDICINE CLINIC | Facility: CLINIC | Age: 73
End: 2023-12-28

## 2023-12-28 ENCOUNTER — HOSPITAL ENCOUNTER (OUTPATIENT)
Dept: RADIOLOGY | Facility: HOSPITAL | Age: 73
End: 2023-12-28
Payer: COMMERCIAL

## 2023-12-28 DIAGNOSIS — G89.29 CHRONIC PAIN OF RIGHT KNEE: ICD-10-CM

## 2023-12-28 DIAGNOSIS — M25.561 CHRONIC PAIN OF RIGHT KNEE: ICD-10-CM

## 2023-12-28 PROCEDURE — 73562 X-RAY EXAM OF KNEE 3: CPT

## 2023-12-28 NOTE — TELEPHONE ENCOUNTER
Called Pt. Spoke to Pt, and Pt advised on results her right knee xray shows arthritis and effusion or collection of fluids. Recommend ortho consult   Provided # for [facility]   [phone]

## 2023-12-28 NOTE — TELEPHONE ENCOUNTER
----- Message from RICARDO Hassan sent at 12/28/2023  4:43 PM EST -----  Please let Briana know her right knee xray shows arthritis and effusion or collection of fluids. Recommend ortho consult

## 2024-01-22 ENCOUNTER — TELEPHONE (OUTPATIENT)
Dept: FAMILY MEDICINE CLINIC | Facility: CLINIC | Age: 74
End: 2024-01-22

## 2024-01-22 NOTE — TELEPHONE ENCOUNTER
Yes, this is Briana Leung. I was just wondering if I need to come to speak with Doctor Brie. I had, I guess, an episode with my afib on Thursday evening last week. I seem to be fine, so if you would just let me know if I need to come in or what? My phone number is [phone]. Thank you.      Call pt back and say she had an episode of her Afib she went to the hospital and says have a follow up was in [place]

## 2024-01-23 ENCOUNTER — RA CDI HCC (OUTPATIENT)
Dept: OTHER | Facility: HOSPITAL | Age: 74
End: 2024-01-23

## 2024-01-23 NOTE — PROGRESS NOTES
HCC coding opportunities          Chart Reviewed number of suggestions sent to Provider: 1     Patients Insurance   I48.92  Medicare Insurance: [employer] Advantage

## 2024-01-26 ENCOUNTER — TELEMEDICINE (OUTPATIENT)
Dept: FAMILY MEDICINE CLINIC | Facility: CLINIC | Age: 74
End: 2024-01-26
Payer: COMMERCIAL

## 2024-01-26 DIAGNOSIS — Z79.01 ANTICOAGULANT LONG-TERM USE: ICD-10-CM

## 2024-01-26 DIAGNOSIS — I48.0 PAROXYSMAL ATRIAL FIBRILLATION (HCC): Primary | ICD-10-CM

## 2024-01-26 DIAGNOSIS — E66.01 CLASS 2 SEVERE OBESITY WITH BODY MASS INDEX (BMI) OF 35 TO 39.9 WITH SERIOUS COMORBIDITY (HCC): ICD-10-CM

## 2024-01-26 PROBLEM — M79.10 MYALGIA: Status: ACTIVE | Noted: 2017-08-25

## 2024-01-26 PROCEDURE — 99214 OFFICE O/P EST MOD 30 MIN: CPT | Performed by: FAMILY MEDICINE

## 2024-01-26 NOTE — PROGRESS NOTES
Assessment/Plan:      1. Paroxysmal atrial fibrillation (HCC)  Assessment & Plan:  Had recurrent episode of atrial fibrillation, seen in ED. No chest pain or shortness of breath. Schedule follow up with cardiology.   Pt can take second dose of metoprolol 25mg after checking blood pressure and pulse.  Pt on xarelto.       2. Anticoagulant long-term use    3. Class 2 severe obesity with body mass index (BMI) of 35 to 39.9 with serious comorbidity (HCC)          Subjective:  Chief Complaint   Patient presents with    Virtual Regular Visit     [facility]         Patient ID: Briana Leung is a 73 y.o. female.    Pt is seen with her son today.  Pt Had ablation in the past.   Pt had been doing some strenuous work, moving cut wood.   Seen in the ED at [place]. Converted in the ED and advised to follow up with cardiology and PCP   She had no symptoms with recurrent atrial fibrillation. No chest pain, no shortness of breath.   Feeling ok now, no recurrent episodes.   Reviewed ED report        Review of Systems   Constitutional: Negative.  Negative for fatigue and fever.   HENT: Negative.     Eyes: Negative.    Respiratory: Negative.  Negative for cough and shortness of breath.    Cardiovascular:  Negative for chest pain.   Gastrointestinal: Negative.    Endocrine: Negative.    Genitourinary: Negative.    Musculoskeletal: Negative.    Skin: Negative.    Allergic/Immunologic: Negative.    Neurological: Negative.    Psychiatric/Behavioral: Negative.           The following portions of the patient's history were reviewed and updated as appropriate: allergies, current medications, past family history, past medical history, past social history, past surgical history and problem list.    Objective:  There were no vitals filed for this visit.   Physical Exam  Vitals and nursing note reviewed.   Constitutional:       Appearance: Normal appearance. She is well-developed.   HENT:      Head: Normocephalic  and atraumatic.   Eyes:      Conjunctiva/sclera: Conjunctivae normal.   Pulmonary:      Effort: Pulmonary effort is normal.   Neurological:      Mental Status: She is alert and oriented to person, place, and time.   Psychiatric:         Behavior: Behavior normal.         Thought Content: Thought content normal.         Judgment: Judgment normal.

## 2024-01-27 PROBLEM — N30.00 ACUTE CYSTITIS WITHOUT HEMATURIA: Status: RESOLVED | Noted: 2020-08-18 | Resolved: 2024-01-27

## 2024-01-28 NOTE — ASSESSMENT & PLAN NOTE
Had recurrent episode of atrial fibrillation, seen in ED. No chest pain or shortness of breath. Schedule follow up with cardiology.   Pt can take second dose of metoprolol 25mg after checking blood pressure and pulse.  Pt on xarelto.

## 2024-01-31 ENCOUNTER — OFFICE VISIT (OUTPATIENT)
Age: 74
End: 2024-01-31
Payer: COMMERCIAL

## 2024-01-31 VITALS
SYSTOLIC BLOOD PRESSURE: 144 MMHG | DIASTOLIC BLOOD PRESSURE: 88 MMHG | HEIGHT: 64 IN | OXYGEN SATURATION: 98 % | WEIGHT: 230.8 LBS | TEMPERATURE: 96.6 F | BODY MASS INDEX: 39.4 KG/M2 | HEART RATE: 63 BPM

## 2024-01-31 DIAGNOSIS — R91.1 LUNG NODULE: Primary | ICD-10-CM

## 2024-01-31 DIAGNOSIS — I48.21 PERMANENT ATRIAL FIBRILLATION (HCC): ICD-10-CM

## 2024-01-31 DIAGNOSIS — E66.01 CLASS 2 SEVERE OBESITY WITH BODY MASS INDEX (BMI) OF 35 TO 39.9 WITH SERIOUS COMORBIDITY (HCC): ICD-10-CM

## 2024-01-31 PROCEDURE — 99214 OFFICE O/P EST MOD 30 MIN: CPT | Performed by: INTERNAL MEDICINE

## 2024-01-31 NOTE — ASSESSMENT & PLAN NOTE
Briana and I reviewed her CAT scan.  She has stable lung nodules that need annual follow-up.  I scheduled her 1 year follow-up on today's visit.  She denies any respiratory symptoms and is up-to-date on all her vaccines.

## 2024-01-31 NOTE — ASSESSMENT & PLAN NOTE
Briana and I discussed her recent episode of recurrence of atrial fibrillation.  On today's visit she is in normal sinus rhythm.  She is taking her Xarelto and metoprolol and Tambocor

## 2024-01-31 NOTE — PROGRESS NOTES
"Assessment/Plan:    Lung nodule  Briana and I reviewed her CAT scan.  She has stable lung nodules that need annual follow-up.  I scheduled her 1 year follow-up on today's visit.  She denies any respiratory symptoms and is up-to-date on all her vaccines.    Atrial fibrillation (HCC)  Briana and I discussed her recent episode of recurrence of atrial fibrillation.  On today's visit she is in normal sinus rhythm.  She is taking her Xarelto and metoprolol and Tambocor     Diagnoses and all orders for this visit:    Lung nodule  -     CT chest without contrast; Future    Class 2 severe obesity with body mass index (BMI) of 35 to 39.9 with serious comorbidity (HCC)    Permanent atrial fibrillation (HCC)          Subjective:      Patient ID: Briana Leung is a 73 y.o. female.    Briana is here for follow-up of her lung nodules.  She remains asymptomatic from pulmonary standpoint denies any cough shortness of breath or wheeze.  She recently had a bout of paroxysmal A-fib        The following portions of the patient's history were reviewed and updated as appropriate: allergies, current medications, past family history, past medical history, past social history, past surgical history, and problem list.    Review of Systems   Constitutional: Negative.  Negative for unexpected weight change.   HENT: Negative.  Negative for postnasal drip.    Eyes: Negative.    Respiratory: Negative.  Negative for cough, shortness of breath and wheezing.    Cardiovascular: Negative.  Negative for chest pain and leg swelling.   Gastrointestinal: Negative.    Endocrine: Negative.    Genitourinary: Negative.    Musculoskeletal: Negative.    Allergic/Immunologic: Negative.    Neurological: Negative.    Hematological: Negative.          Objective:      /88 (BP Location: Left arm, Patient Position: Sitting, Cuff Size: Standard)   Pulse 63   Temp (!) 96.6 °F (35.9 °C) (Temporal)   Ht 5' 4\" (1.626 m)   Wt 105 kg (230 lb 12.8 oz)   SpO2 98%  "  BMI 39.62 kg/m²          Physical Exam  Constitutional:       Appearance: She is well-developed.   HENT:      Head: Normocephalic.   Eyes:      Pupils: Pupils are equal, round, and reactive to light.   Cardiovascular:      Rate and Rhythm: Normal rate.      Heart sounds: No murmur heard.  Pulmonary:      Effort: Pulmonary effort is normal. No respiratory distress.      Breath sounds: Normal breath sounds. No wheezing or rales.   Abdominal:      Palpations: Abdomen is soft.   Musculoskeletal:         General: Normal range of motion.      Cervical back: Normal range of motion and neck supple.   Skin:     General: Skin is warm and dry.   Neurological:      Mental Status: She is alert and oriented to person, place, and time.

## 2024-02-04 DIAGNOSIS — E03.9 ACQUIRED HYPOTHYROIDISM: ICD-10-CM

## 2024-02-05 RX ORDER — LEVOTHYROXINE SODIUM 0.05 MG/1
TABLET ORAL
Qty: 90 TABLET | Refills: 0 | Status: SHIPPED | OUTPATIENT
Start: 2024-02-05

## 2024-02-08 LAB — TSH SERPL-ACNC: 2.05 MIU/L (ref 0.4–4.5)

## 2024-02-12 ENCOUNTER — TELEPHONE (OUTPATIENT)
Dept: FAMILY MEDICINE CLINIC | Facility: CLINIC | Age: 74
End: 2024-02-12

## 2024-02-12 NOTE — TELEPHONE ENCOUNTER
----- Message from Paty Baird DO sent at 2/12/2024 12:03 AM EST -----  No mychart  Your thyroid level is normal

## 2024-02-21 PROBLEM — Z01.419 ENCOUNTER FOR GYNECOLOGICAL EXAMINATION (GENERAL) (ROUTINE) WITHOUT ABNORMAL FINDINGS: Status: RESOLVED | Noted: 2022-08-18 | Resolved: 2024-02-21

## 2024-02-24 ENCOUNTER — HOSPITAL ENCOUNTER (OUTPATIENT)
Dept: CT IMAGING | Facility: HOSPITAL | Age: 74
Discharge: HOME | End: 2024-02-24
Attending: INTERNAL MEDICINE
Payer: COMMERCIAL

## 2024-02-24 DIAGNOSIS — R91.1 LUNG NODULE: ICD-10-CM

## 2024-02-24 PROCEDURE — 71250 CT THORAX DX C-: CPT

## 2024-05-05 DIAGNOSIS — E03.9 ACQUIRED HYPOTHYROIDISM: ICD-10-CM

## 2024-05-05 RX ORDER — LEVOTHYROXINE SODIUM 0.05 MG/1
TABLET ORAL
Qty: 90 TABLET | Refills: 2 | Status: SHIPPED | OUTPATIENT
Start: 2024-05-05

## 2024-06-25 ENCOUNTER — OFFICE VISIT (OUTPATIENT)
Dept: FAMILY MEDICINE CLINIC | Facility: CLINIC | Age: 74
End: 2024-06-25
Payer: COMMERCIAL

## 2024-06-25 VITALS
SYSTOLIC BLOOD PRESSURE: 122 MMHG | TEMPERATURE: 97.4 F | BODY MASS INDEX: 39.44 KG/M2 | HEART RATE: 64 BPM | WEIGHT: 231 LBS | DIASTOLIC BLOOD PRESSURE: 70 MMHG | OXYGEN SATURATION: 96 % | HEIGHT: 64 IN

## 2024-06-25 DIAGNOSIS — R10.10 UPPER ABDOMINAL PAIN: Primary | ICD-10-CM

## 2024-06-25 DIAGNOSIS — I48.0 PAROXYSMAL ATRIAL FIBRILLATION (HCC): ICD-10-CM

## 2024-06-25 PROCEDURE — 99214 OFFICE O/P EST MOD 30 MIN: CPT | Performed by: FAMILY MEDICINE

## 2024-06-25 PROCEDURE — G2211 COMPLEX E/M VISIT ADD ON: HCPCS | Performed by: FAMILY MEDICINE

## 2024-06-25 RX ORDER — UREA 10 %
500 LOTION (ML) TOPICAL 2 TIMES DAILY
COMMUNITY

## 2024-06-25 NOTE — PROGRESS NOTES
"Assessment/Plan:      1. Upper abdominal pain  -     US abdomen complete; Future; Expected date: 06/25/2024  2. Paroxysmal atrial fibrillation (HCC)        Subjective:  Chief Complaint   Patient presents with    Follow-up     Follow up from [facility] ED from 6/21/2024 for stomach pain.        Patient ID: Briana Leung is a 73 y.o. female.    Pt is seen in follow up to recent ED visit at [place].  Pt Went out Friday am to water plants- 2 hours 8:30-10:30am did not eat anything prior - took her thyroid medication  Had chills when she came in the house, ate yogurt.   Pain across the top of her stomach radiated around. No nausea.   Called her son then Called squad- transported to [place]. felt nauseated and vomited x1. given 4 81mg aspirin, had EKG, blood work- reviewed records.   Bit by a deer tick on 6/18. Left ankle. No rash, no fever, no increased joint pains.        Review of Systems   Constitutional: Negative.  Negative for fatigue and fever.   HENT: Negative.     Eyes: Negative.    Respiratory: Negative.  Negative for cough.    Cardiovascular: Negative.    Gastrointestinal:  Positive for abdominal pain.   Endocrine: Negative.    Genitourinary: Negative.    Musculoskeletal: Negative.    Skin: Negative.    Allergic/Immunologic: Negative.    Neurological: Negative.    Psychiatric/Behavioral: Negative.           The following portions of the patient's history were reviewed and updated as appropriate: allergies, current medications, past family history, past medical history, past social history, past surgical history and problem list.    Objective:  Vitals:    06/25/24 1510   BP: 122/70   BP Location: Left arm   Patient Position: Sitting   Cuff Size: Standard   Pulse: 64   Temp: (!) 97.4 °F (36.3 °C)   TempSrc: Tympanic   SpO2: 96%   Weight: 105 kg (231 lb)   Height: 5' 4\" (1.626 m)      Physical Exam  Vitals and nursing note reviewed.   Constitutional:       Appearance: She is well-developed.   HENT: "      Head: Normocephalic and atraumatic.   Cardiovascular:      Rate and Rhythm: Normal rate and regular rhythm.      Heart sounds: Normal heart sounds.   Pulmonary:      Effort: Pulmonary effort is normal.      Breath sounds: Normal breath sounds.   Abdominal:      General: Bowel sounds are normal.      Palpations: Abdomen is soft.      Tenderness: There is abdominal tenderness.      Comments: Tenderness epigastric and right upper quadrant.   Skin:     General: Skin is warm and dry.   Neurological:      Mental Status: She is alert and oriented to person, place, and time.   Psychiatric:         Behavior: Behavior normal.         Thought Content: Thought content normal.         Judgment: Judgment normal.

## 2024-06-25 NOTE — PATIENT INSTRUCTIONS
Schedule ultrasound abdomen r/o cholecystitis. May need gi follow up and egd if symptoms persist.  Avoid fatty foods.   Call if symptoms worsen, nausea, vomiting, fever, increased pain

## 2024-06-30 PROBLEM — R10.10 UPPER ABDOMINAL PAIN: Status: ACTIVE | Noted: 2024-06-30

## 2024-07-17 ENCOUNTER — HOSPITAL ENCOUNTER (OUTPATIENT)
Dept: ULTRASOUND IMAGING | Facility: HOSPITAL | Age: 74
Discharge: HOME | End: 2024-07-17
Payer: COMMERCIAL

## 2024-07-17 DIAGNOSIS — R10.10 UPPER ABDOMINAL PAIN: ICD-10-CM

## 2024-07-17 PROCEDURE — 76700 US EXAM ABDOM COMPLETE: CPT

## 2024-07-25 ENCOUNTER — TELEPHONE (OUTPATIENT)
Age: 74
End: 2024-07-25

## 2024-07-25 NOTE — TELEPHONE ENCOUNTER
Patient had called in wanting to know the results. Went ahead and got a nurse on the line to go over them.

## 2024-07-25 NOTE — TELEPHONE ENCOUNTER
"Pt called for US results. Gave Dr. Baird's message \"Your ultrasound shows a polyp in your gallbladder which because of the size being small, no follow up is recommended.  Your gallbladder wall is slightly thickened but similar to a prior ct scan  And your liver has a small collection of blood vessels that are benign.  Overall normal  How are you feeling?  If ongoing symptoms- gastroenterology for evaluation\"     Pt said her symptoms resolved and she is feeling better. Pt had no further questions.   "

## 2024-10-04 NOTE — PROGRESS NOTES
Assessment/Plan:    Encounter for gynecological examination (general) (routine) without abnormal findings  Here for two year Medicare breast and pelvic exams.   No breast or gyn concerns.   Normal breast and pelvic exams.  Last pap 2017 neg/HPV neg; no further indicated, guidelines reviewed.  Mammo order given, last 9/27/23 BIRADS 1C  Colonoscopy 2020, due 2025  Dexa  9/19/22 femoral neck T-1.8, low risk of fracture. Repeat next time.     Diagnoses and all orders for this visit:    Encounter for gynecological examination (general) (routine) without abnormal findings    Encounter for screening mammogram for breast cancer  -     Mammo screening bilateral w 3d and cad; Future        Subjective:      Patient ID: Briana Leung is a 74 y.o. female.    HPI Here for Medicare biannual breast and pelvic exams.     The following portions of the patient's history were reviewed and updated as appropriate: She  has a past medical history of Acute cystitis without hematuria, Arthritis, Atrial fibrillation (HCC), Back pain, Bursitis, Disease of thyroid gland, Fibrocystic breast disease, Ganglion cyst of wrist, right, Herpes zoster (2011), Hiatal hernia, Lung nodule, solitary, Lyme disease, Osteopenia of neck of left femur, Postmenopausal hormone replacement therapy, and Vertigo.  She   Patient Active Problem List    Diagnosis Date Noted    Upper abdominal pain 06/30/2024    Osteopenia of hip 08/16/2022    Mild mitral regurgitation by prior echocardiogram 07/26/2022    Moderate tricuspid regurgitation 07/26/2022    Encounter for screening mammogram for malignant neoplasm of breast 06/23/2021    Incidental lung nodule, > 3mm and < 8mm 06/23/2021    Vertigo 06/23/2021    Rash 06/25/2019    Class 2 severe obesity with body mass index (BMI) of 35 to 39.9 with serious comorbidity (HCC)     Chronic right-sided thoracic back pain 09/22/2017    Pain of right scapula 09/22/2017    Pain syndrome, chronic 09/22/2017    Post herpetic  neuralgia 09/22/2017    Myalgia 08/25/2017    Low back pain 08/09/2017    Neuropathic pain 08/09/2017    Acute pain of right shoulder 08/09/2017    Hiatal hernia 07/12/2016    Vitamin D deficiency 04/04/2016    High risk medication use 12/07/2015    Anticoagulant long-term use 08/19/2015    Anxiety 07/30/2015    Bruit of left carotid artery 07/30/2015    Hypothyroidism 07/30/2015    Paroxysmal atrial fibrillation (HCC) 07/30/2015    Osteoporosis 05/29/2013    Esophageal reflux 05/03/2013    Hyperlipidemia 05/03/2013    Asymptomatic varicose veins 05/03/2013     She  has a past surgical history that includes Colonoscopy (10/2020); Cardiac electrophysiology study and ablation (2015); Breast biopsy (Right); and DXA procedure(historical) (08/2018).  Her family history includes Cerebral aneurysm in her sister; Diabetes in her maternal grandmother and mother; Heart disease in her family.  She  reports that she has never smoked. She has never used smokeless tobacco. She reports that she does not drink alcohol and does not use drugs.  Current Outpatient Medications   Medication Sig Dispense Refill    Bioflavonoid Products (SUNI C PO) Take 500 mg by mouth daily      cholecalciferol (VITAMIN D3) 1,000 units tablet Take 5,000 Units by mouth daily      flecainide (TAMBOCOR) 100 mg tablet Take 100 mg by mouth 2 (two) times a day  6    glucosamine-chondroitin 500-400 MG tablet Take 1 tablet by mouth daily      KLOR-CON M20 20 MEQ tablet Take 20 mEq by mouth daily  1    levothyroxine 50 mcg tablet TAKE 1 TABLET BY MOUTH EVERY DAY 90 tablet 2    magnesium gluconate (MAGONATE) 500 mg tablet Take 500 mg by mouth 2 (two) times a day      metoprolol succinate (TOPROL-XL) 25 mg 24 hr tablet Take 1 tablet by mouth daily      Multiple Vitamin (MULTIVITAMIN) tablet Take 2 tablets by mouth daily      Progesterone 40 % CREA Apply topically      Red Yeast Rice 600 MG CAPS Take 600 capsules by mouth        rivaroxaban (XARELTO) 20 mg tablet  "Take 1 tablet by mouth       No current facility-administered medications for this visit.     She has No Known Allergies..    Review of Systems  No PMB, breast, bladder, bowel changes. No new persistent pain, early satiety or pelvic pressure. Mixed incontinence (1 pad per day)    Objective:    /74   Ht 5' 4\" (1.626 m)   Wt 103 kg (228 lb)   BMI 39.14 kg/m²      Physical Exam    General appearance: no distress, pleasant  Neck: thyroid without nodules or thyromegaly, no palpable adenopathy  Lymph nodes: no palpable adenopathy  Breasts: no masses, nodes or skin changes  Abdomen: soft, non tender, no palpable masses  Pelvic exam: normal atrophic external genitalia, urethral meatus normal, vagina atrophic without lesions, cervix atrophic without lesions, bimanual limited due to habitus, uterus small, non tender, no adnexal masses, non tender  Rectal exam: normal sphincter tone, no masses, RV confirms above    "

## 2024-10-07 ENCOUNTER — ANNUAL EXAM (OUTPATIENT)
Dept: OBGYN CLINIC | Facility: CLINIC | Age: 74
End: 2024-10-07
Payer: COMMERCIAL

## 2024-10-07 VITALS
SYSTOLIC BLOOD PRESSURE: 126 MMHG | BODY MASS INDEX: 38.93 KG/M2 | HEIGHT: 64 IN | WEIGHT: 228 LBS | DIASTOLIC BLOOD PRESSURE: 74 MMHG

## 2024-10-07 DIAGNOSIS — Z12.31 ENCOUNTER FOR SCREENING MAMMOGRAM FOR BREAST CANCER: ICD-10-CM

## 2024-10-07 DIAGNOSIS — Z01.419 ENCOUNTER FOR GYNECOLOGICAL EXAMINATION (GENERAL) (ROUTINE) WITHOUT ABNORMAL FINDINGS: Primary | ICD-10-CM

## 2024-10-07 PROCEDURE — G0101 CA SCREEN;PELVIC/BREAST EXAM: HCPCS | Performed by: OBSTETRICS & GYNECOLOGY

## 2024-10-07 NOTE — LETTER
October 7, 2024     Paty Baird,   [address] [id number]    Patient: Briana Leung   YOB: 1950   Date of Visit: 10/7/2024       Dear Dr. Baird:    Thank you for referring Briana Leugn to me for evaluation. Below are my notes for this consultation.    If you have questions, please do not hesitate to call me. I look forward to following your patient along with you.         Sincerely,        Massiel Spears MD        CC: No Recipients    Massiel Spears MD  10/7/2024  2:02 PM  Sign when Signing Visit  Assessment/Plan:    Encounter for gynecological examination (general) (routine) without abnormal findings  Here for two year Medicare breast and pelvic exams.   No breast or gyn concerns.   Normal breast and pelvic exams.  Last pap 2017 neg/HPV neg; no further indicated, guidelines reviewed.  Mammo order given, last 9/27/23 BIRADS 1C  Colonoscopy 2020, due 2025  Dexa  9/19/22 femoral neck T-1.8, low risk of fracture. Repeat next time.     Diagnoses and all orders for this visit:    Encounter for gynecological examination (general) (routine) without abnormal findings    Encounter for screening mammogram for breast cancer  -     Mammo screening bilateral w 3d and cad; Future        Subjective:      Patient ID: Briana Leung is a 74 y.o. female.    HPI Here for Medicare biannual breast and pelvic exams.     The following portions of the patient's history were reviewed and updated as appropriate: She  has a past medical history of Acute cystitis without hematuria, Arthritis, Atrial fibrillation (HCC), Back pain, Bursitis, Disease of thyroid gland, Fibrocystic breast disease, Ganglion cyst of wrist, right, Herpes zoster (2011), Hiatal hernia, Lung nodule, solitary, Lyme disease, Osteopenia of neck of left femur, Postmenopausal hormone replacement therapy, and Vertigo.  She   Patient Active Problem List    Diagnosis Date Noted   • Upper abdominal pain 06/30/2024   • Osteopenia of  hip 08/16/2022   • Mild mitral regurgitation by prior echocardiogram 07/26/2022   • Moderate tricuspid regurgitation 07/26/2022   • Encounter for screening mammogram for malignant neoplasm of breast 06/23/2021   • Incidental lung nodule, > 3mm and < 8mm 06/23/2021   • Vertigo 06/23/2021   • Rash 06/25/2019   • Class 2 severe obesity with body mass index (BMI) of 35 to 39.9 with serious comorbidity (HCC)    • Chronic right-sided thoracic back pain 09/22/2017   • Pain of right scapula 09/22/2017   • Pain syndrome, chronic 09/22/2017   • Post herpetic neuralgia 09/22/2017   • Myalgia 08/25/2017   • Low back pain 08/09/2017   • Neuropathic pain 08/09/2017   • Acute pain of right shoulder 08/09/2017   • Hiatal hernia 07/12/2016   • Vitamin D deficiency 04/04/2016   • High risk medication use 12/07/2015   • Anticoagulant long-term use 08/19/2015   • Anxiety 07/30/2015   • Bruit of left carotid artery 07/30/2015   • Hypothyroidism 07/30/2015   • Paroxysmal atrial fibrillation (HCC) 07/30/2015   • Osteoporosis 05/29/2013   • Esophageal reflux 05/03/2013   • Hyperlipidemia 05/03/2013   • Asymptomatic varicose veins 05/03/2013     She  has a past surgical history that includes Colonoscopy (10/2020); Cardiac electrophysiology study and ablation (2015); Breast biopsy (Right); and DXA procedure(historical) (08/2018).  Her family history includes Cerebral aneurysm in her sister; Diabetes in her maternal grandmother and mother; Heart disease in her family.  She  reports that she has never smoked. She has never used smokeless tobacco. She reports that she does not drink alcohol and does not use drugs.  Current Outpatient Medications   Medication Sig Dispense Refill   • Bioflavonoid Products (SUNI C PO) Take 500 mg by mouth daily     • cholecalciferol (VITAMIN D3) 1,000 units tablet Take 5,000 Units by mouth daily     • flecainide (TAMBOCOR) 100 mg tablet Take 100 mg by mouth 2 (two) times a day  6   • glucosamine-chondroitin  "500-400 MG tablet Take 1 tablet by mouth daily     • KLOR-CON M20 20 MEQ tablet Take 20 mEq by mouth daily  1   • levothyroxine 50 mcg tablet TAKE 1 TABLET BY MOUTH EVERY DAY 90 tablet 2   • magnesium gluconate (MAGONATE) 500 mg tablet Take 500 mg by mouth 2 (two) times a day     • metoprolol succinate (TOPROL-XL) 25 mg 24 hr tablet Take 1 tablet by mouth daily     • Multiple Vitamin (MULTIVITAMIN) tablet Take 2 tablets by mouth daily     • Progesterone 40 % CREA Apply topically     • Red Yeast Rice 600 MG CAPS Take 600 capsules by mouth       • rivaroxaban (XARELTO) 20 mg tablet Take 1 tablet by mouth       No current facility-administered medications for this visit.     She has No Known Allergies..    Review of Systems  No PMB, breast, bladder, bowel changes. No new persistent pain, early satiety or pelvic pressure. Mixed incontinence (1 pad per day)    Objective:    /74   Ht 5' 4\" (1.626 m)   Wt 103 kg (228 lb)   BMI 39.14 kg/m²      Physical Exam    General appearance: no distress, pleasant  Neck: thyroid without nodules or thyromegaly, no palpable adenopathy  Lymph nodes: no palpable adenopathy  Breasts: no masses, nodes or skin changes  Abdomen: soft, non tender, no palpable masses  Pelvic exam: normal atrophic external genitalia, urethral meatus normal, vagina atrophic without lesions, cervix atrophic without lesions, bimanual limited due to habitus, uterus small, non tender, no adnexal masses, non tender  Rectal exam: normal sphincter tone, no masses, RV confirms above    "

## 2024-10-07 NOTE — ASSESSMENT & PLAN NOTE
Here for two year Medicare breast and pelvic exams.   No breast or gyn concerns.   Normal breast and pelvic exams.  Last pap 2017 neg/HPV neg; no further indicated, guidelines reviewed.  Mammo order given, last 9/27/23 BIRADS 1C  Colonoscopy 2020, due 2025  Dexa  9/19/22 femoral neck T-1.8, low risk of fracture. Repeat next time.

## 2024-10-07 NOTE — PATIENT INSTRUCTIONS
Return to office in two years unless having any problems such as breast changes, bleeding, new persistent pain, new progressive bloating, new problems eating (getting full to quickly) or new constant urinary pressure that does not resolve in one week.    Continue to strive for 1200 mg of calcium and 1000 IU of vitamin D daily in diet and supplements combined for your bone health.  You can only absorb 600 mg of calcium at a time. Avoid excess calcium as this may adversely effect your heart.  There are 400 mg of calcium in an 8 oz serving of dairy.

## 2024-10-10 ENCOUNTER — TELEPHONE (OUTPATIENT)
Age: 74
End: 2024-10-10

## 2024-10-14 DIAGNOSIS — Z12.31 ENCOUNTER FOR SCREENING MAMMOGRAM FOR BREAST CANCER: ICD-10-CM

## 2025-01-22 ENCOUNTER — OFFICE VISIT (OUTPATIENT)
Age: 75
End: 2025-01-22
Payer: COMMERCIAL

## 2025-01-22 VITALS
HEIGHT: 64 IN | DIASTOLIC BLOOD PRESSURE: 84 MMHG | SYSTOLIC BLOOD PRESSURE: 142 MMHG | WEIGHT: 235.4 LBS | OXYGEN SATURATION: 98 % | TEMPERATURE: 97.9 F | HEART RATE: 65 BPM | BODY MASS INDEX: 40.19 KG/M2

## 2025-01-22 DIAGNOSIS — R91.1 LUNG NODULE: Primary | ICD-10-CM

## 2025-01-22 DIAGNOSIS — R91.1 INCIDENTAL LUNG NODULE, > 3MM AND < 8MM: ICD-10-CM

## 2025-01-22 DIAGNOSIS — E66.812 CLASS 2 SEVERE OBESITY WITH BODY MASS INDEX (BMI) OF 35 TO 39.9 WITH SERIOUS COMORBIDITY (HCC): ICD-10-CM

## 2025-01-22 DIAGNOSIS — I48.0 PAROXYSMAL ATRIAL FIBRILLATION (HCC): ICD-10-CM

## 2025-01-22 DIAGNOSIS — E66.01 CLASS 2 SEVERE OBESITY WITH BODY MASS INDEX (BMI) OF 35 TO 39.9 WITH SERIOUS COMORBIDITY (HCC): ICD-10-CM

## 2025-01-22 PROCEDURE — 99213 OFFICE O/P EST LOW 20 MIN: CPT | Performed by: INTERNAL MEDICINE

## 2025-01-22 PROCEDURE — G2211 COMPLEX E/M VISIT ADD ON: HCPCS | Performed by: INTERNAL MEDICINE

## 2025-01-22 NOTE — ASSESSMENT & PLAN NOTE
Patient needs 1 additional lung nodule screening to assure stability over 2 years.  At this is stable no further need for follow-up

## 2025-01-22 NOTE — PROGRESS NOTES
"Name: Briana Leung      : 1950      MRN: [id number]  Encounter Provider: Yvette Villela DO  Encounter Date: 2025   Encounter department: [facility]  :  Assessment & Plan  Lung nodule    Orders:    CT chest without contrast; Future    Class 2 severe obesity with body mass index (BMI) of 35 to 39.9 with serious comorbidity (HCC)         Paroxysmal atrial fibrillation (HCC)  Stable on today's exam heart rate is normal patient maintains on Xarelto         Incidental lung nodule, > 3mm and < 8mm  Patient needs 1 additional lung nodule screening to assure stability over 2 years.  At this is stable no further need for follow-up             History of Present Illness   HPI  Briana Leung is a 74 y.o. female who presents for follow-up of lung nodules.  She denies any shortness of breath.  History obtained from: patient    Review of Systems  Medical History Reviewed by provider this encounter:     .     Objective   /84 (BP Location: Left arm, Patient Position: Sitting, Cuff Size: Standard)   Pulse 65   Temp 97.9 °F (36.6 °C) (Tympanic)   Ht 5' 4\" (1.626 m)   Wt 107 kg (235 lb 6.4 oz)   SpO2 98%   BMI 40.41 kg/m²      Physical Exam    Administrative Statements   I have spent a total time of 25 minutes in caring for this patient on the day of the visit/encounter including Diagnostic results, Prognosis, Reviewing / ordering tests, medicine, procedures  , and Obtaining or reviewing history  .   "

## 2025-01-25 DIAGNOSIS — E03.9 ACQUIRED HYPOTHYROIDISM: ICD-10-CM

## 2025-01-25 RX ORDER — LEVOTHYROXINE SODIUM 50 UG/1
50 TABLET ORAL DAILY
Qty: 90 TABLET | Refills: 2 | Status: SHIPPED | OUTPATIENT
Start: 2025-01-25

## 2025-02-01 ENCOUNTER — HOSPITAL ENCOUNTER (OUTPATIENT)
Dept: CT IMAGING | Facility: HOSPITAL | Age: 75
Discharge: HOME | End: 2025-02-01
Attending: INTERNAL MEDICINE
Payer: COMMERCIAL

## 2025-02-01 DIAGNOSIS — R91.1 LUNG NODULE: ICD-10-CM

## 2025-02-01 PROCEDURE — 71250 CT THORAX DX C-: CPT

## 2025-02-10 ENCOUNTER — TELEPHONE (OUTPATIENT)
Age: 75
End: 2025-02-10

## 2025-02-10 NOTE — TELEPHONE ENCOUNTER
Patient called to discuss results of CT scan completed 2/1/25. Patient requested a call back asap to discuss. Please advise.

## 2025-02-11 NOTE — TELEPHONE ENCOUNTER
Pt was calling back to go over results. Informed her that CT  showed that lung nodules have been stable for 8 years and are considered noncancerous

## 2025-04-28 ENCOUNTER — VBI (OUTPATIENT)
Dept: ADMINISTRATIVE | Facility: OTHER | Age: 75
End: 2025-04-28

## 2025-04-28 NOTE — TELEPHONE ENCOUNTER
Patient contacted to schedule Annual Wellness Visit.   A message was left for the patient to return the call.    Thank you.  Bri Kirby  [facility]

## 2025-05-14 ENCOUNTER — RA CDI HCC (OUTPATIENT)
Dept: OTHER | Facility: HOSPITAL | Age: 75
End: 2025-05-14

## 2025-05-21 ENCOUNTER — OFFICE VISIT (OUTPATIENT)
Dept: FAMILY MEDICINE CLINIC | Facility: CLINIC | Age: 75
End: 2025-05-21
Payer: COMMERCIAL

## 2025-05-21 VITALS
HEIGHT: 64 IN | HEART RATE: 64 BPM | SYSTOLIC BLOOD PRESSURE: 102 MMHG | BODY MASS INDEX: 39.78 KG/M2 | DIASTOLIC BLOOD PRESSURE: 58 MMHG | OXYGEN SATURATION: 96 % | TEMPERATURE: 97.3 F | WEIGHT: 233 LBS

## 2025-05-21 DIAGNOSIS — R07.89 OTHER CHEST PAIN: ICD-10-CM

## 2025-05-21 DIAGNOSIS — E66.01 CLASS 2 SEVERE OBESITY WITH BODY MASS INDEX (BMI) OF 35 TO 39.9 WITH SERIOUS COMORBIDITY (HCC): ICD-10-CM

## 2025-05-21 DIAGNOSIS — I48.0 PAROXYSMAL ATRIAL FIBRILLATION (HCC): ICD-10-CM

## 2025-05-21 DIAGNOSIS — E03.9 ACQUIRED HYPOTHYROIDISM: ICD-10-CM

## 2025-05-21 DIAGNOSIS — E66.812 CLASS 2 SEVERE OBESITY WITH BODY MASS INDEX (BMI) OF 35 TO 39.9 WITH SERIOUS COMORBIDITY (HCC): ICD-10-CM

## 2025-05-21 DIAGNOSIS — Z00.00 MEDICARE ANNUAL WELLNESS VISIT, SUBSEQUENT: Primary | ICD-10-CM

## 2025-05-21 PROCEDURE — G0439 PPPS, SUBSEQ VISIT: HCPCS | Performed by: FAMILY MEDICINE

## 2025-05-21 PROCEDURE — 99213 OFFICE O/P EST LOW 20 MIN: CPT | Performed by: FAMILY MEDICINE

## 2025-05-21 PROCEDURE — G2211 COMPLEX E/M VISIT ADD ON: HCPCS | Performed by: FAMILY MEDICINE

## 2025-05-21 NOTE — PATIENT INSTRUCTIONS
Due for TSH check - nonfasting  Keep follow up with cardiology  To ED if symptoms recur  Medicare Preventive Visit Patient Instructions  Thank you for completing your Welcome to Medicare Visit or Medicare Annual Wellness Visit today. Your next wellness visit will be due in one year (5/22/2026).  The screening/preventive services that you may require over the next 5-10 years are detailed below. Some tests may not apply to you based off risk factors and/or age. Screening tests ordered at today's visit but not completed yet may show as past due. Also, please note that scanned in results may not display below.  Preventive Screenings:  Service Recommendations Previous Testing/Comments   Colorectal Cancer Screening  * Colonoscopy    * Fecal Occult Blood Test (FOBT)/Fecal Immunochemical Test (FIT)  * Fecal DNA/Cologuard Test  * Flexible Sigmoidoscopy Age: 45-75 years old   Colonoscopy: every 10 years (may be performed more frequently if at higher risk)  OR  FOBT/FIT: every 1 year  OR  Cologuard: every 3 years  OR  Sigmoidoscopy: every 5 years  Screening may be recommended earlier than age 45 if at higher risk for colorectal cancer. Also, an individualized decision between you and your healthcare provider will decide whether screening between the ages of 76-85 would be appropriate. Colonoscopy: 10/01/2020  FOBT/FIT: Not on file  Cologuard: Not on file  Sigmoidoscopy: Not on file    Screening Current     Breast Cancer Screening Age: 40+ years old  Frequency: every 1-2 years  Not required if history of left and right mastectomy Mammogram: 10/11/2024    Screening Current   Cervical Cancer Screening Between the ages of 21-29, pap smear recommended once every 3 years.   Between the ages of 30-65, can perform pap smear with HPV co-testing every 5 years.   Recommendations may differ for women with a history of total hysterectomy, cervical cancer, or abnormal pap smears in past. Pap Smear: 10/07/2024    Screening Not Indicated    Hepatitis C Screening Once for adults born between 1945 and 1965  More frequently in patients at high risk for Hepatitis C Hep C Antibody: 06/14/2017    Screening Current   Diabetes Screening 1-2 times per year if you're at risk for diabetes or have pre-diabetes Fasting glucose: No results in last 5 years (No results in last 5 years)  A1C: No results in last 5 years (No results in last 5 years)      Cholesterol Screening Once every 5 years if you don't have a lipid disorder. May order more often based on risk factors. Lipid panel: Not on file    Screening Not Indicated  History Lipid Disorder     Other Preventive Screenings Covered by Medicare:  Abdominal Aortic Aneurysm (AAA) Screening: covered once if your at risk. You're considered to be at risk if you have a family history of AAA.  Lung Cancer Screening: covers low dose CT scan once per year if you meet all of the following conditions: (1) Age 55-77; (2) No signs or symptoms of lung cancer; (3) Current smoker or have quit smoking within the last 15 years; (4) You have a tobacco smoking history of at least 20 pack years (packs per day multiplied by number of years you smoked); (5) You get a written order from a healthcare provider.  Glaucoma Screening: covered annually if you're considered high risk: (1) You have diabetes OR (2) Family history of glaucoma OR (3)  aged 50 and older OR (4)  American aged 65 and older  Osteoporosis Screening: covered every 2 years if you meet one of the following conditions: (1) You're estrogen deficient and at risk for osteoporosis based off medical history and other findings; (2) Have a vertebral abnormality; (3) On glucocorticoid therapy for more than 3 months; (4) Have primary hyperparathyroidism; (5) On osteoporosis medications and need to assess response to drug therapy.   Last bone density test (DXA Scan): 09/19/2022.  HIV Screening: covered annually if you're between the age of 15-65. Also covered  annually if you are younger than 15 and older than 65 with risk factors for HIV infection. For pregnant patients, it is covered up to 3 times per pregnancy.    Immunizations:  Immunization Recommendations   Influenza Vaccine Annual influenza vaccination during flu season is recommended for all persons aged >= 6 months who do not have contraindications   Pneumococcal Vaccine   * Pneumococcal conjugate vaccine = PCV13 (Prevnar 13), PCV15 (Vaxneuvance), PCV20 (Prevnar 20)  * Pneumococcal polysaccharide vaccine = PPSV23 (Pneumovax) Adults 19-63 yo with certain risk factors or if 65+ yo  If never received any pneumonia vaccine: recommend Prevnar 20 (PCV20)  Give PCV20 if previously received 1 dose of PCV13 or PPSV23   Hepatitis B Vaccine 3 dose series if at intermediate or high risk (ex: diabetes, end stage renal disease, liver disease)   Respiratory syncytial virus (RSV) Vaccine - COVERED BY MEDICARE PART D  * RSVPreF3 (Arexvy) CDC recommends that adults 60 years of age and older may receive a single dose of RSV vaccine using shared clinical decision-making (SCDM)   Tetanus (Td) Vaccine - COST NOT COVERED BY MEDICARE PART B Following completion of primary series, a booster dose should be given every 10 years to maintain immunity against tetanus. Td may also be given as tetanus wound prophylaxis.   Tdap Vaccine - COST NOT COVERED BY MEDICARE PART B Recommended at least once for all adults. For pregnant patients, recommended with each pregnancy.   Shingles Vaccine (Shingrix) - COST NOT COVERED BY MEDICARE PART B  2 shot series recommended in those 19 years and older who have or will have weakened immune systems or those 50 years and older     Health Maintenance Due:      Topic Date Due    Colorectal Cancer Screening  10/01/2025    Breast Cancer Screening: Mammogram  10/11/2025    DXA SCAN  09/19/2027    Hepatitis C Screening  Completed     Immunizations Due:  There are no preventive care reminders to display for this  patient.  Advance Directives   What are advance directives?  Advance directives are legal documents that state your wishes and plans for medical care. These plans are made ahead of time in case you lose your ability to make decisions for yourself. Advance directives can apply to any medical decision, such as the treatments you want, and if you want to donate organs.   What are the types of advance directives?  There are many types of advance directives, and each state has rules about how to use them. You may choose a combination of any of the following:  Living will:  This is a written record of the treatment you want. You can also choose which treatments you do not want, which to limit, and which to stop at a certain time. This includes surgery, medicine, IV fluid, and tube feedings.   Durable power of  for healthcare (DPAHC):  This is a written record that states who you want to make healthcare choices for you when you are unable to make them for yourself. This person, called a proxy, is usually a family member or a friend. You may choose more than 1 proxy.  Do not resuscitate (DNR) order:  A DNR order is used in case your heart stops beating or you stop breathing. It is a request not to have certain forms of treatment, such as CPR. A DNR order may be included in other types of advance directives.  Medical directive:  This covers the care that you want if you are in a coma, near death, or unable to make decisions for yourself. You can list the treatments you want for each condition. Treatment may include pain medicine, surgery, blood transfusions, dialysis, IV or tube feedings, and a ventilator (breathing machine).  Values history:  This document has questions about your views, beliefs, and how you feel and think about life. This information can help others choose the care that you would choose.  Why are advance directives important?  An advance directive helps you control your care. Although spoken wishes  may be used, it is better to have your wishes written down. Spoken wishes can be misunderstood, or not followed. Treatments may be given even if you do not want them. An advance directive may make it easier for your family to make difficult choices about your care.   Weight Management   Why it is important to manage your weight:  Being overweight increases your risk of health conditions such as heart disease, high blood pressure, type 2 diabetes, and certain types of cancer. It can also increase your risk for osteoarthritis, sleep apnea, and other respiratory problems. Aim for a slow, steady weight loss. Even a small amount of weight loss can lower your risk of health problems.  How to lose weight safely:  A safe and healthy way to lose weight is to eat fewer calories and get regular exercise. You can lose up about 1 pound a week by decreasing the number of calories you eat by 500 calories each day.   Healthy meal plan for weight management:  A healthy meal plan includes a variety of foods, contains fewer calories, and helps you stay healthy. A healthy meal plan includes the following:  Eat whole-grain foods more often.  A healthy meal plan should contain fiber. Fiber is the part of grains, fruits, and vegetables that is not broken down by your body. Whole-grain foods are healthy and provide extra fiber in your diet. Some examples of whole-grain foods are whole-wheat breads and pastas, oatmeal, brown rice, and bulgur.  Eat a variety of vegetables every day.  Include dark, leafy greens such as spinach, kale, nathan greens, and mustard greens. Eat yellow and orange vegetables such as carrots, sweet potatoes, and winter squash.   Eat a variety of fruits every day.  Choose fresh or canned fruit (canned in its own juice or light syrup) instead of juice. Fruit juice has very little or no fiber.  Eat low-fat dairy foods.  Drink fat-free (skim) milk or 1% milk. Eat fat-free yogurt and low-fat cottage cheese. Try low-fat  cheeses such as mozzarella and other reduced-fat cheeses.  Choose meat and other protein foods that are low in fat.  Choose beans or other legumes such as split peas or lentils. Choose fish, skinless poultry (chicken or turkey), or lean cuts of red meat (beef or pork). Before you cook meat or poultry, cut off any visible fat.   Use less fat and oil.  Try baking foods instead of frying them. Add less fat, such as margarine, sour cream, regular salad dressing and mayonnaise to foods. Eat fewer high-fat foods. Some examples of high-fat foods include french fries, doughnuts, ice cream, and cakes.  Eat fewer sweets.  Limit foods and drinks that are high in sugar. This includes candy, cookies, regular soda, and sweetened drinks.  Exercise:  Exercise at least 30 minutes per day on most days of the week. Some examples of exercise include walking, biking, dancing, and swimming. You can also fit in more physical activity by taking the stairs instead of the elevator or parking farther away from stores. Ask your healthcare provider about the best exercise plan for you.    © Copyright [employer] 2018 Information is for End User's use only and may not be sold, redistributed or otherwise used for commercial purposes. All illustrations and images included in CareNotes® are the copyrighted property of A.D.A.M., Inc. or [employer]

## 2025-05-21 NOTE — PROGRESS NOTES
Name: Briana Leung      : 1950      MRN: [id number]  Encounter Provider: Paty Baird DO  Encounter Date: 2025   Encounter department: [facility]  :  Assessment & Plan  Medicare annual wellness visit, subsequent         Other chest pain  Seen at [facility], reviewed EKG, blood work and xray.   Has follow up with cardiology for further evaluation.          Paroxysmal atrial fibrillation (HCC)  Pt currently on flecainide and metoprolol and xarelto       Acquired hypothyroidism  Due for tsh     Orders:    TSH, 3rd generation with Free T4 reflex; Future    Class 2 severe obesity with body mass index (BMI) of 35 to 39.9 with serious comorbidity (HCC)             Depression Screening and Follow-up Plan: Patient was screened for depression during today's encounter. They screened negative with a PHQ-2 score of 0.        Preventive health issues were discussed with patient, and age appropriate screening tests were ordered as noted in patient's After Visit Summary. Personalized health advice and appropriate referrals for health education or preventive services given if needed, as noted in patient's After Visit Summary.    History of Present Illness     Pt is here for medicare wellness  Had 2 episodes of chest pain  and   Pt Was wearing tight bra. No shortness of breath, no nausea,   She was seen in [facility] ED and had an EKG and xray, blood tests that were all negative.   She has a Follow up in 3 weeks with cardiology   Pt has not had any further episodes since seen        Patient Care Team:  Paty Baird DO as PCP - General (Family Medicine)  DO Andrey Bella CRNP    Review of Systems   Constitutional: Negative.  Negative for fatigue and fever.   HENT: Negative.     Eyes: Negative.    Respiratory:  Positive for chest tightness. Negative for cough and shortness of breath.    Cardiovascular: Negative.    Gastrointestinal: Negative.    Endocrine: Negative.     Genitourinary: Negative.    Musculoskeletal: Negative.    Skin: Negative.    Allergic/Immunologic: Negative.    Neurological: Negative.    Psychiatric/Behavioral: Negative.       Medical History Reviewed by provider this encounter:  Tobacco  Allergies  Meds  Problems  Med Hx  Surg Hx  Fam Hx       Annual Wellness Visit Questionnaire   Briana is here for her Subsequent Wellness visit. Last Medicare Wellness visit information reviewed, patient interviewed and updates made to the record today.      Health Risk Assessment:   Patient rates overall health as very good. Patient feels that their physical health rating is slightly better. Patient is very satisfied with their life. Eyesight was rated as same. Hearing was rated as same. Patient feels that their emotional and mental health rating is same. Patients states they are never, rarely angry. Patient states they are sometimes unusually tired/fatigued. Pain experienced in the last 7 days has been some. Patient's pain rating has been 3/10. Patient states that she has experienced no weight loss or gain in last 6 months.     Depression Screening:   PHQ-2 Score: 0      Fall Risk Screening:   In the past year, patient has experienced: no history of falling in past year      Urinary Incontinence Screening:   Patient has not leaked urine accidently in the last six months.     Home Safety:  Patient does not have trouble with stairs inside or outside of their home. Patient has working smoke alarms and has no working carbon monoxide detector. Home safety hazards include: none.     Nutrition:   Current diet is Regular.     Medications:   Patient is currently taking over-the-counter supplements. OTC medications include: see medication list. Patient is able to manage medications.     Activities of Daily Living (ADLs)/Instrumental Activities of Daily Living (IADLs):   Walk and transfer into and out of bed and chair?: Yes  Dress and groom yourself?: Yes    Bathe or shower  yourself?: Yes    Feed yourself? Yes  Do your laundry/housekeeping?: Yes  Manage your money, pay your bills and track your expenses?: Yes  Make your own meals?: Yes    Do your own shopping?: Yes    Previous Hospitalizations:   Any hospitalizations or ED visits within the last 12 months?: Yes    How many hospitalizations have you had in the last year?: 1-2    Preventive Screenings      Cardiovascular Screening:    General: Screening Not Indicated and History Lipid Disorder      Colorectal Cancer Screening:     General: Screening Current      Breast Cancer Screening:     General: Screening Current      Cervical Cancer Screening:    General: Screening Not Indicated      Osteoporosis Screening:    General: Screening Not Indicated and History Osteoporosis      Lung Cancer Screening:     General: Screening Not Indicated      Hepatitis C Screening:    General: Screening Current    Screening, Brief Intervention, and Referral to Treatment (SBIRT)     Screening  Typical number of drinks in a day: 0  Typical number of drinks in a week: 0  Interpretation: Low risk drinking behavior.    Single Item Drug Screening:  How often have you used an illegal drug (including marijuana) or a prescription medication for non-medical reasons in the past year? never    Single Item Drug Screen Score: 0  Interpretation: Negative screen for possible drug use disorder    Social Drivers of Health     Financial Resource Strain: Low Risk  (12/14/2023)    Overall Financial Resource Strain (CARDIA)     Difficulty of Paying Living Expenses: Not hard at all   Food Insecurity: No Food Insecurity (5/21/2025)    Nursing - Inadequate Food Risk Classification     Worried About Running Out of Food in the Last Year: Never true     Ran Out of Food in the Last Year: Never true   Transportation Needs: No Transportation Needs (5/21/2025)    PRAPARE - Transportation     Lack of Transportation (Medical): No     Lack of Transportation (Non-Medical): No   Housing  "Stability: Low Risk  (5/21/2025)    Housing Stability Vital Sign     Unable to Pay for Housing in the Last Year: No     Number of Times Moved in the Last Year: 0     Homeless in the Last Year: No   Utilities: Not At Risk (5/21/2025)    [facility] Utilities     Threatened with loss of utilities: No     No results found.    Objective   /58 (BP Location: Left arm, Patient Position: Sitting, Cuff Size: Large)   Pulse 64   Temp (!) 97.3 °F (36.3 °C) (Tympanic)   Ht 5' 4\" (1.626 m)   Wt 106 kg (233 lb)   SpO2 96%   BMI 39.99 kg/m²     Physical Exam  Vitals and nursing note reviewed.   Constitutional:       Appearance: She is well-developed. She is obese.   HENT:      Head: Normocephalic and atraumatic.     Cardiovascular:      Rate and Rhythm: Normal rate and regular rhythm.      Heart sounds: Normal heart sounds.   Pulmonary:      Effort: Pulmonary effort is normal.      Breath sounds: Normal breath sounds.   Abdominal:      General: Bowel sounds are normal.      Palpations: Abdomen is soft.     Skin:     General: Skin is warm and dry.     Neurological:      Mental Status: She is alert and oriented to person, place, and time.     Psychiatric:         Behavior: Behavior normal.         Thought Content: Thought content normal.         Judgment: Judgment normal.         "

## 2025-06-02 ENCOUNTER — TELEPHONE (OUTPATIENT)
Dept: FAMILY MEDICINE CLINIC | Facility: CLINIC | Age: 75
End: 2025-06-02

## 2025-06-02 NOTE — TELEPHONE ENCOUNTER
Received fax request for most recent PCP encounter to be faxed for appointment on 6/11/2025 for patient. Please sign office visit from 5/21/2025.

## 2025-06-12 PROBLEM — R07.89 OTHER CHEST PAIN: Status: ACTIVE | Noted: 2025-06-12

## 2025-06-13 NOTE — ASSESSMENT & PLAN NOTE
Seen at [facility], reviewed EKG, blood work and xray.   Has follow up with cardiology for further evaluation.

## 2025-06-20 PROBLEM — Z00.00 MEDICARE ANNUAL WELLNESS VISIT, SUBSEQUENT: Status: RESOLVED | Noted: 2022-07-26 | Resolved: 2025-06-20
